# Patient Record
Sex: MALE | Race: WHITE | NOT HISPANIC OR LATINO
[De-identification: names, ages, dates, MRNs, and addresses within clinical notes are randomized per-mention and may not be internally consistent; named-entity substitution may affect disease eponyms.]

---

## 2017-11-03 ENCOUNTER — APPOINTMENT (OUTPATIENT)
Dept: OTHER | Facility: CLINIC | Age: 52
End: 2017-11-03
Payer: COMMERCIAL

## 2017-11-03 VITALS
OXYGEN SATURATION: 99 % | WEIGHT: 213 LBS | RESPIRATION RATE: 16 BRPM | DIASTOLIC BLOOD PRESSURE: 81 MMHG | HEIGHT: 73 IN | HEART RATE: 70 BPM | BODY MASS INDEX: 28.23 KG/M2 | SYSTOLIC BLOOD PRESSURE: 145 MMHG

## 2017-11-03 PROCEDURE — 99213 OFFICE O/P EST LOW 20 MIN: CPT | Mod: 25

## 2017-11-03 PROCEDURE — 94010 BREATHING CAPACITY TEST: CPT

## 2017-11-03 PROCEDURE — 96150: CPT

## 2017-11-03 PROCEDURE — 99396 PREV VISIT EST AGE 40-64: CPT

## 2017-11-03 RX ORDER — AMLODIPINE AND VALSARTAN 5; 160 MG/1; MG/1
5-160 TABLET, FILM COATED ORAL
Qty: 90 | Refills: 0 | Status: ACTIVE | COMMUNITY
Start: 2016-09-01

## 2017-11-06 LAB
ALBUMIN SERPL ELPH-MCNC: 4.6 G/DL
ALP BLD-CCNC: 103 U/L
ALT SERPL-CCNC: 75 U/L
ANION GAP SERPL CALC-SCNC: 15 MMOL/L
APPEARANCE: CLEAR
AST SERPL-CCNC: 37 U/L
BASOPHILS # BLD AUTO: 0.03 K/UL
BASOPHILS NFR BLD AUTO: 0.4 %
BILIRUB SERPL-MCNC: 0.7 MG/DL
BILIRUBIN URINE: NEGATIVE
BLOOD URINE: NEGATIVE
BUN SERPL-MCNC: 17 MG/DL
CALCIUM SERPL-MCNC: 10.4 MG/DL
CHLORIDE SERPL-SCNC: 101 MMOL/L
CHOLEST SERPL-MCNC: 217 MG/DL
CHOLEST/HDLC SERPL: 5 RATIO
CO2 SERPL-SCNC: 25 MMOL/L
COLOR: YELLOW
CREAT SERPL-MCNC: 1.09 MG/DL
EOSINOPHIL # BLD AUTO: 0.1 K/UL
EOSINOPHIL NFR BLD AUTO: 1.5 %
GLUCOSE QUALITATIVE U: NEGATIVE MG/DL
GLUCOSE SERPL-MCNC: 84 MG/DL
HCT VFR BLD CALC: 44.5 %
HDLC SERPL-MCNC: 43 MG/DL
HGB BLD-MCNC: 15.3 G/DL
IMM GRANULOCYTES NFR BLD AUTO: 0.1 %
KETONES URINE: NEGATIVE
LDLC SERPL CALC-MCNC: 123 MG/DL
LEUKOCYTE ESTERASE URINE: NEGATIVE
LYMPHOCYTES # BLD AUTO: 1.93 K/UL
LYMPHOCYTES NFR BLD AUTO: 28.3 %
MAN DIFF?: NORMAL
MCHC RBC-ENTMCNC: 31 PG
MCHC RBC-ENTMCNC: 34.4 GM/DL
MCV RBC AUTO: 90.3 FL
MONOCYTES # BLD AUTO: 0.5 K/UL
MONOCYTES NFR BLD AUTO: 7.3 %
NEUTROPHILS # BLD AUTO: 4.25 K/UL
NEUTROPHILS NFR BLD AUTO: 62.4 %
NITRITE URINE: NEGATIVE
PH URINE: 6
PLATELET # BLD AUTO: 263 K/UL
POTASSIUM SERPL-SCNC: 5.2 MMOL/L
PROT SERPL-MCNC: 7.5 G/DL
PROTEIN URINE: NEGATIVE MG/DL
RBC # BLD: 4.93 M/UL
RBC # FLD: 12.3 %
SODIUM SERPL-SCNC: 141 MMOL/L
SPECIFIC GRAVITY URINE: 1.01
TRIGL SERPL-MCNC: 253 MG/DL
UROBILINOGEN URINE: NEGATIVE MG/DL
WBC # FLD AUTO: 6.82 K/UL

## 2017-11-28 ENCOUNTER — APPOINTMENT (OUTPATIENT)
Dept: PSYCHIATRY | Facility: CLINIC | Age: 52
End: 2017-11-28
Payer: COMMERCIAL

## 2017-11-28 PROCEDURE — 90791 PSYCH DIAGNOSTIC EVALUATION: CPT

## 2017-12-05 ENCOUNTER — APPOINTMENT (OUTPATIENT)
Dept: GASTROENTEROLOGY | Facility: CLINIC | Age: 52
End: 2017-12-05
Payer: COMMERCIAL

## 2017-12-05 VITALS
BODY MASS INDEX: 28.49 KG/M2 | TEMPERATURE: 98 F | RESPIRATION RATE: 16 BRPM | HEIGHT: 73 IN | SYSTOLIC BLOOD PRESSURE: 148 MMHG | WEIGHT: 215 LBS | DIASTOLIC BLOOD PRESSURE: 82 MMHG | HEART RATE: 74 BPM

## 2017-12-05 DIAGNOSIS — Z12.11 ENCOUNTER FOR SCREENING FOR MALIGNANT NEOPLASM OF COLON: ICD-10-CM

## 2017-12-05 PROCEDURE — 99203 OFFICE O/P NEW LOW 30 MIN: CPT

## 2018-01-16 ENCOUNTER — EMERGENCY (EMERGENCY)
Facility: HOSPITAL | Age: 53
LOS: 1 days | Discharge: HOME | End: 2018-01-16
Attending: EMERGENCY MEDICINE
Payer: COMMERCIAL

## 2018-01-16 VITALS
HEIGHT: 74 IN | WEIGHT: 220.02 LBS | RESPIRATION RATE: 18 BRPM | DIASTOLIC BLOOD PRESSURE: 80 MMHG | HEART RATE: 75 BPM | SYSTOLIC BLOOD PRESSURE: 156 MMHG | TEMPERATURE: 98 F | OXYGEN SATURATION: 97 %

## 2018-01-16 DIAGNOSIS — Z98.89 OTHER SPECIFIED POSTPROCEDURAL STATES: Chronic | ICD-10-CM

## 2018-01-16 PROCEDURE — 73562 X-RAY EXAM OF KNEE 3: CPT

## 2018-01-16 PROCEDURE — 29505 APPLICATION LONG LEG SPLINT: CPT | Mod: LT

## 2018-01-16 PROCEDURE — 99284 EMERGENCY DEPT VISIT MOD MDM: CPT | Mod: 25

## 2018-01-16 PROCEDURE — 20610 DRAIN/INJ JOINT/BURSA W/O US: CPT | Mod: LT

## 2018-01-16 PROCEDURE — 73562 X-RAY EXAM OF KNEE 3: CPT | Mod: 26,LT

## 2018-01-16 PROCEDURE — 99284 EMERGENCY DEPT VISIT MOD MDM: CPT

## 2018-01-16 RX ORDER — IBUPROFEN 200 MG
600 TABLET ORAL ONCE
Qty: 0 | Refills: 0 | Status: COMPLETED | OUTPATIENT
Start: 2018-01-16 | End: 2018-01-16

## 2018-01-16 RX ADMIN — Medication 600 MILLIGRAM(S): at 14:26

## 2018-01-16 RX ADMIN — Medication 600 MILLIGRAM(S): at 12:57

## 2018-01-16 NOTE — ED ADULT NURSE NOTE - OBJECTIVE STATEMENT
AOX3 +ambulatory patient reports he twisted his knee while getting out of the car x today. Patient c/o L knee pain. +sensory ROM lImited

## 2018-01-16 NOTE — CONSULT NOTE ADULT - SUBJECTIVE AND OBJECTIVE BOX
HPI: Patient is a 54 y/o male, s/p left knee arthroscopy about 20 years ago who presents to ER with complaint of severe knee pain and swelling. Patient states that he twisted his left knee in his car at work today.     PAST MEDICAL & SURGICAL HISTORY:  Hypertension  History of left arthroscopy of left knee    Review of systems: Non Contributory    MEDICATIONS  (STANDING):    Allergies: No known Allergies    Vital Signs Last 24 Hrs  T(C): 36.7 (16 Jan 2018 12:45), Max: 36.7 (16 Jan 2018 12:45)  T(F): 98.1 (16 Jan 2018 12:45), Max: 98.1 (16 Jan 2018 12:45)  HR: 75 (16 Jan 2018 12:45) (75 - 75)  BP: 156/80 (16 Jan 2018 12:45) (156/80 - 156/80)  BP(mean): --  RR: 18 (16 Jan 2018 12:45) (18 - 18)  SpO2: 97% (16 Jan 2018 12:45) (97% - 97%)    Physical Examination:    Musculoskeletal:   Left knee: Positive tenderness to palpation of joint line, 3+ effusion, pain on flexion of the knee, decreased range of motion, positive [facility] maneuver.     Neurovascularly Intact    RADIOLOGY & ADDITIONAL STUDIES: X ray: DJD and multiple loose bodies.    ASSESSMENT: Left knee sprain, preexisting DJD, severe swelling and pain.     PLAN/RECOMMENDATION: Under sterile condition, left knee was aspirated and about 40 CC of yellowish fluid was obtained, and was injected with 80 mg of Depomedrol and 5 CC of lidocaine was injected. Patient tolerated procedure well. A long leg splint immobilizer was applied. Cane was given.     FOLLOW UP: With office in 3-4 days.

## 2018-01-16 NOTE — ED PROVIDER NOTE - PHYSICAL EXAMINATION
L knee: limited ROM, no obvious deformity, no swelling/effusion, ecchymosis, neg drawer L knee: limited ROM, no obvious deformity, mild swelling, ecchymosis, neg drawer

## 2018-01-16 NOTE — ED PROVIDER NOTE - MEDICAL DECISION MAKING DETAILS
pt well appearing, antalgic gait, limited ROM secondary to pain to R knee, Abdulkadir, VIET to place in knee immobilizer, crutches, and dc home as pt going through workman's comp. Instructions to RICE. OTC IBU.

## 2018-01-16 NOTE — ED PROVIDER NOTE - OBJECTIVE STATEMENT
52 y/o male, pmhx HTN, psx "Lknee surgery", c/o L knee pain s/p "twisting knee" in car at work today. Denies falls, laxity, fever/chills, h/o gout or any other concerns. 54 y/o male, pmhx HTN, psx "L knee surgery", c/o L knee pain s/p "twisting knee" in car at work today. Denies falls, laxity, fever/chills, h/o gout or any other concerns. Confirms limited ROM.

## 2018-01-16 NOTE — ED PROVIDER NOTE - ATTENDING CONTRIBUTION TO CARE
52 y/o male c/o L knee pain s/p "twisting knee" in car at work today. PE reveals mild swelling left anterior knee. (-) ant/post drawer test. (-) lachmans. XR (-) for fx. Will d/c home to f/u with ortho as outpt.

## 2018-02-03 ENCOUNTER — MOBILE ON CALL (OUTPATIENT)
Age: 53
End: 2018-02-03

## 2019-03-29 ENCOUNTER — APPOINTMENT (OUTPATIENT)
Dept: OTHER | Facility: CLINIC | Age: 54
End: 2019-03-29
Payer: COMMERCIAL

## 2019-03-29 VITALS
DIASTOLIC BLOOD PRESSURE: 83 MMHG | HEIGHT: 73 IN | SYSTOLIC BLOOD PRESSURE: 126 MMHG | WEIGHT: 216 LBS | BODY MASS INDEX: 28.63 KG/M2 | HEART RATE: 75 BPM | RESPIRATION RATE: 18 BRPM | OXYGEN SATURATION: 98 % | TEMPERATURE: 97.8 F

## 2019-03-29 DIAGNOSIS — Z78.9 OTHER SPECIFIED HEALTH STATUS: ICD-10-CM

## 2019-03-29 PROCEDURE — 99396 PREV VISIT EST AGE 40-64: CPT | Mod: 25

## 2019-03-29 PROCEDURE — 99214 OFFICE O/P EST MOD 30 MIN: CPT | Mod: 25

## 2019-03-29 PROCEDURE — 94010 BREATHING CAPACITY TEST: CPT

## 2019-03-29 PROCEDURE — 96150: CPT

## 2019-04-01 LAB
ALBUMIN SERPL ELPH-MCNC: 4.5 G/DL
ALP BLD-CCNC: 141 U/L
ALT SERPL-CCNC: 34 U/L
ANION GAP SERPL CALC-SCNC: 12 MMOL/L
APPEARANCE: ABNORMAL
AST SERPL-CCNC: 25 U/L
BACTERIA: NEGATIVE
BASOPHILS # BLD AUTO: 0.04 K/UL
BASOPHILS NFR BLD AUTO: 0.6 %
BILIRUB SERPL-MCNC: 0.2 MG/DL
BILIRUBIN URINE: NEGATIVE
BLOOD URINE: NEGATIVE
BUN SERPL-MCNC: 16 MG/DL
CALCIUM SERPL-MCNC: 10 MG/DL
CHLORIDE SERPL-SCNC: 100 MMOL/L
CHOLEST SERPL-MCNC: 183 MG/DL
CHOLEST/HDLC SERPL: 5.7 RATIO
CO2 SERPL-SCNC: 27 MMOL/L
COLOR: YELLOW
CREAT SERPL-MCNC: 1.16 MG/DL
EOSINOPHIL # BLD AUTO: 0.16 K/UL
EOSINOPHIL NFR BLD AUTO: 2.2 %
GLUCOSE QUALITATIVE U: NEGATIVE
GLUCOSE SERPL-MCNC: 107 MG/DL
HCT VFR BLD CALC: 43.7 %
HDLC SERPL-MCNC: 32 MG/DL
HGB BLD-MCNC: 13.8 G/DL
HYALINE CASTS: 0 /LPF
IMM GRANULOCYTES NFR BLD AUTO: 0.1 %
KETONES URINE: NEGATIVE
LDLC SERPL CALC-MCNC: 77 MG/DL
LEUKOCYTE ESTERASE URINE: NEGATIVE
LYMPHOCYTES # BLD AUTO: 1.91 K/UL
LYMPHOCYTES NFR BLD AUTO: 26.3 %
MAN DIFF?: NORMAL
MCHC RBC-ENTMCNC: 29.8 PG
MCHC RBC-ENTMCNC: 31.6 GM/DL
MCV RBC AUTO: 94.4 FL
MICROSCOPIC-UA: NORMAL
MONOCYTES # BLD AUTO: 0.86 K/UL
MONOCYTES NFR BLD AUTO: 11.8 %
NEUTROPHILS # BLD AUTO: 4.28 K/UL
NEUTROPHILS NFR BLD AUTO: 59 %
NITRITE URINE: NEGATIVE
PH URINE: 6
PLATELET # BLD AUTO: 291 K/UL
POTASSIUM SERPL-SCNC: 4.6 MMOL/L
PROT SERPL-MCNC: 7.5 G/DL
PROTEIN URINE: ABNORMAL
RBC # BLD: 4.63 M/UL
RBC # FLD: 11.9 %
RED BLOOD CELLS URINE: 4 /HPF
SODIUM SERPL-SCNC: 139 MMOL/L
SPECIFIC GRAVITY URINE: 1.03
SQUAMOUS EPITHELIAL CELLS: 0 /HPF
TRIGL SERPL-MCNC: 368 MG/DL
UROBILINOGEN URINE: NORMAL
WBC # FLD AUTO: 7.26 K/UL
WHITE BLOOD CELLS URINE: 1 /HPF

## 2019-05-28 ENCOUNTER — MEDICATION RENEWAL (OUTPATIENT)
Age: 54
End: 2019-05-28

## 2019-06-04 ENCOUNTER — APPOINTMENT (OUTPATIENT)
Dept: GASTROENTEROLOGY | Facility: CLINIC | Age: 54
End: 2019-06-04

## 2019-12-27 ENCOUNTER — MEDICATION RENEWAL (OUTPATIENT)
Age: 54
End: 2019-12-27

## 2020-04-27 ENCOUNTER — RX RENEWAL (OUTPATIENT)
Age: 55
End: 2020-04-27

## 2020-05-15 ENCOUNTER — APPOINTMENT (OUTPATIENT)
Dept: OTHER | Facility: CLINIC | Age: 55
End: 2020-05-15
Payer: COMMERCIAL

## 2020-05-15 PROCEDURE — 99442: CPT | Mod: 95

## 2020-05-15 PROCEDURE — 99396 PREV VISIT EST AGE 40-64: CPT | Mod: 95

## 2020-05-17 ENCOUNTER — FORM ENCOUNTER (OUTPATIENT)
Age: 55
End: 2020-05-17

## 2020-06-15 ENCOUNTER — APPOINTMENT (OUTPATIENT)
Dept: GASTROENTEROLOGY | Facility: CLINIC | Age: 55
End: 2020-06-15

## 2022-02-07 ENCOUNTER — RX RENEWAL (OUTPATIENT)
Age: 57
End: 2022-02-07

## 2022-07-26 ENCOUNTER — EMERGENCY (EMERGENCY)
Facility: HOSPITAL | Age: 57
LOS: 1 days | Discharge: HOME | End: 2022-07-26
Attending: EMERGENCY MEDICINE
Payer: COMMERCIAL

## 2022-07-26 VITALS
DIASTOLIC BLOOD PRESSURE: 85 MMHG | WEIGHT: 214.95 LBS | HEART RATE: 69 BPM | SYSTOLIC BLOOD PRESSURE: 173 MMHG | RESPIRATION RATE: 18 BRPM | OXYGEN SATURATION: 98 % | TEMPERATURE: 97 F | HEIGHT: 74 IN

## 2022-07-26 DIAGNOSIS — Z98.89 OTHER SPECIFIED POSTPROCEDURAL STATES: Chronic | ICD-10-CM

## 2022-07-26 PROCEDURE — 73080 X-RAY EXAM OF ELBOW: CPT

## 2022-07-26 PROCEDURE — 99283 EMERGENCY DEPT VISIT LOW MDM: CPT

## 2022-07-26 PROCEDURE — 99283 EMERGENCY DEPT VISIT LOW MDM: CPT | Mod: 25

## 2022-07-26 PROCEDURE — 73080 X-RAY EXAM OF ELBOW: CPT | Mod: 26,LT

## 2022-07-26 NOTE — ED PROVIDER NOTE - PHYSICAL EXAMINATION
Renal insufficiency L elbow nearly full range of motion. Olecranon swelling with mild erythema. No induration or localized tenderness to palpation. L shoulder and L wrist full range of motion without swelling or tenderness to palpation.  No snuffbox tenderness.

## 2022-07-26 NOTE — ED ADULT NURSE NOTE - NSIMPLEMENTINTERV_GEN_ALL_ED
Implemented All Fall Risk Interventions:  [place] to call system. Call bell, personal items and telephone within reach. Instruct patient to call for assistance. Room bathroom lighting operational. Non-slip footwear when patient is off stretcher. Physically safe environment: no spills, clutter or unnecessary equipment. Stretcher in lowest position, wheels locked, appropriate side rails in place. Provide visual cue, wrist band, yellow gown, etc. Monitor gait and stability. Monitor for mental status changes and reorient to person, place, and time. Review medications for side effects contributing to fall risk. Reinforce activity limits and safety measures with patient and family.

## 2022-07-26 NOTE — ED PROVIDER NOTE - CLINICAL SUMMARY MEDICAL DECISION MAKING FREE TEXT BOX
57 year-old male, presents for L elbow swelling s/p mechanical fall earlier today. neurovascularly intact. Plan: XR, re-assess.

## 2022-07-26 NOTE — ED ADULT NURSE NOTE - OBJECTIVE STATEMENT
Pt S/P mechanical fall, tripped on stairs and fell on elbow    'tripped up stairs" fell on left elbow c/o pain

## 2022-07-26 NOTE — ED PROVIDER NOTE - PROGRESS NOTE DETAILS
XR negative for fracture. Incidental FB on XR on soft tissue. No related to injury today. Patient informed. Dc olecranon bursitis. Unlikely infection. Most likely traumatic. NSAIDs, ACE wrap and PMD follow up in 5-7 days. Pt is well appearing walking with steady gait, stable for discharge and follow up without fail with medical doctor. I had a detailed discussion with the patient and/or guardian regarding the historical points, exam findings, and any diagnostic results supporting the discharge diagnosis. Pt educated on care and need for follow up. Strict return instructions and red flag signs and symptoms discussed with patient. Questions answered. Pt shows understanding of discharge information and agrees to follow.

## 2022-07-26 NOTE — ED PROVIDER NOTE - OBJECTIVE STATEMENT
57 year-old male, presents with L elbow swelling/pain s/p fall today. Was going up the stairs and tripped and landed on the L elbow. Since then developed swelling to the elbow. Associated with mild pain. Denies any shooting pain down the arm, tingling, numbness, focal weakness or any other complaints.

## 2022-07-26 NOTE — ED PROVIDER NOTE - NSFOLLOWUPINSTRUCTIONS_ED_ALL_ED_FT
Follow up with the primary care doctor in 5-7 days.  If symptoms are not improving in 7-10 days you should follow up with the orthopedist.  For pain you can take over the counter Ibuprofen 600 mg orally every 6 hours as needed for pain. Take medication with food.     If you experience any new or worsening symptoms or if you are concerned you can always come back to the emergency for a re-evaluation.

## 2022-07-26 NOTE — ED PROVIDER NOTE - NS ED ATTENDING STATEMENT MOD
This was a shared visit with the AUBREY. I reviewed and verified the documentation and independently performed the documented:

## 2022-08-22 ENCOUNTER — RESULT REVIEW (OUTPATIENT)
Age: 57
End: 2022-08-22

## 2022-09-25 ENCOUNTER — NON-APPOINTMENT (OUTPATIENT)
Age: 57
End: 2022-09-25

## 2022-10-27 ENCOUNTER — APPOINTMENT (OUTPATIENT)
Dept: OTHER | Facility: CLINIC | Age: 57
End: 2022-10-27

## 2022-10-28 ENCOUNTER — APPOINTMENT (OUTPATIENT)
Dept: OTHER | Facility: CLINIC | Age: 57
End: 2022-10-28

## 2022-10-28 VITALS
HEIGHT: 73 IN | SYSTOLIC BLOOD PRESSURE: 155 MMHG | DIASTOLIC BLOOD PRESSURE: 74 MMHG | WEIGHT: 218 LBS | OXYGEN SATURATION: 98 % | HEART RATE: 70 BPM | TEMPERATURE: 98.7 F | BODY MASS INDEX: 28.89 KG/M2

## 2022-10-28 PROCEDURE — 94010 BREATHING CAPACITY TEST: CPT

## 2022-10-31 LAB
ALBUMIN SERPL ELPH-MCNC: 4.5 G/DL
ALP BLD-CCNC: 133 U/L
ALT SERPL-CCNC: 55 U/L
ANION GAP SERPL CALC-SCNC: 11 MMOL/L
APPEARANCE: CLEAR
AST SERPL-CCNC: 34 U/L
BACTERIA: NEGATIVE
BASOPHILS # BLD AUTO: 0.05 K/UL
BASOPHILS NFR BLD AUTO: 0.5 %
BILIRUB SERPL-MCNC: 0.3 MG/DL
BILIRUBIN URINE: NEGATIVE
BLOOD URINE: NEGATIVE
BUN SERPL-MCNC: 19 MG/DL
CALCIUM SERPL-MCNC: 10 MG/DL
CHLORIDE SERPL-SCNC: 105 MMOL/L
CHOLEST SERPL-MCNC: 193 MG/DL
CO2 SERPL-SCNC: 25 MMOL/L
COLOR: YELLOW
CREAT SERPL-MCNC: 1.15 MG/DL
EGFR: 74 ML/MIN/1.73M2
EOSINOPHIL # BLD AUTO: 0.25 K/UL
EOSINOPHIL NFR BLD AUTO: 2.7 %
GLUCOSE QUALITATIVE U: NEGATIVE
GLUCOSE SERPL-MCNC: 84 MG/DL
HCT VFR BLD CALC: 41.2 %
HDLC SERPL-MCNC: 44 MG/DL
HGB BLD-MCNC: 13.4 G/DL
HYALINE CASTS: 1 /LPF
IMM GRANULOCYTES NFR BLD AUTO: 0.5 %
KETONES URINE: NEGATIVE
LDLC SERPL CALC-MCNC: 108 MG/DL
LEUKOCYTE ESTERASE URINE: NEGATIVE
LYMPHOCYTES # BLD AUTO: 2.46 K/UL
LYMPHOCYTES NFR BLD AUTO: 26.6 %
MAN DIFF?: NORMAL
MCHC RBC-ENTMCNC: 29.8 PG
MCHC RBC-ENTMCNC: 32.5 GM/DL
MCV RBC AUTO: 91.8 FL
MICROSCOPIC-UA: NORMAL
MONOCYTES # BLD AUTO: 0.7 K/UL
MONOCYTES NFR BLD AUTO: 7.6 %
NEUTROPHILS # BLD AUTO: 5.73 K/UL
NEUTROPHILS NFR BLD AUTO: 62.1 %
NITRITE URINE: NEGATIVE
NONHDLC SERPL-MCNC: 148 MG/DL
PH URINE: 6
PLATELET # BLD AUTO: 308 K/UL
POTASSIUM SERPL-SCNC: 4.9 MMOL/L
PROT SERPL-MCNC: 7.2 G/DL
PROTEIN URINE: NORMAL
RBC # BLD: 4.49 M/UL
RBC # FLD: 12.4 %
RED BLOOD CELLS URINE: 2 /HPF
SODIUM SERPL-SCNC: 141 MMOL/L
SPECIFIC GRAVITY URINE: 1.03
SQUAMOUS EPITHELIAL CELLS: 0 /HPF
TRIGL SERPL-MCNC: 199 MG/DL
UROBILINOGEN URINE: NORMAL
WBC # FLD AUTO: 9.24 K/UL
WHITE BLOOD CELLS URINE: 5 /HPF

## 2022-11-15 ENCOUNTER — APPOINTMENT (OUTPATIENT)
Dept: OTHER | Facility: CLINIC | Age: 57
End: 2022-11-15

## 2022-11-15 DIAGNOSIS — Z87.891 PERSONAL HISTORY OF NICOTINE DEPENDENCE: ICD-10-CM

## 2022-11-15 PROCEDURE — 99214 OFFICE O/P EST MOD 30 MIN: CPT | Mod: 95,25

## 2022-11-15 PROCEDURE — 99396 PREV VISIT EST AGE 40-64: CPT | Mod: 95

## 2022-11-15 RX ORDER — TADALAFIL 5 MG/1
5 TABLET ORAL
Qty: 30 | Refills: 0 | Status: ACTIVE | COMMUNITY
Start: 2022-10-20

## 2022-11-15 RX ORDER — LISDEXAMFETAMINE DIMESYLATE 20 MG/1
20 CAPSULE ORAL
Qty: 21 | Refills: 0 | Status: COMPLETED | COMMUNITY
Start: 2022-08-09

## 2022-11-15 RX ORDER — SERTRALINE 25 MG/1
25 TABLET, FILM COATED ORAL
Refills: 0 | Status: COMPLETED | COMMUNITY
End: 2022-11-15

## 2022-11-16 ENCOUNTER — NON-APPOINTMENT (OUTPATIENT)
Age: 57
End: 2022-11-16

## 2023-01-10 NOTE — ED ADULT TRIAGE NOTE - NS ED NURSE BANDS TYPE
Detail Level: Zone Name band; Sunscreen Recommendations: may return in future for full skin exam to check moles and freckles

## 2023-02-19 ENCOUNTER — TRANSCRIPTION ENCOUNTER (OUTPATIENT)
Age: 58
End: 2023-02-19

## 2023-02-20 ENCOUNTER — INPATIENT (INPATIENT)
Facility: HOSPITAL | Age: 58
LOS: 2 days | Discharge: HOME HEALTH | DRG: 339 | End: 2023-02-23
Attending: SURGERY | Admitting: SURGERY
Payer: COMMERCIAL

## 2023-02-20 ENCOUNTER — RESULT REVIEW (OUTPATIENT)
Age: 58
End: 2023-02-20

## 2023-02-20 VITALS
HEART RATE: 85 BPM | OXYGEN SATURATION: 96 % | RESPIRATION RATE: 18 BRPM | DIASTOLIC BLOOD PRESSURE: 77 MMHG | HEIGHT: 73 IN | SYSTOLIC BLOOD PRESSURE: 162 MMHG | TEMPERATURE: 100 F | WEIGHT: 225.09 LBS

## 2023-02-20 DIAGNOSIS — Z98.89 OTHER SPECIFIED POSTPROCEDURAL STATES: Chronic | ICD-10-CM

## 2023-02-20 DIAGNOSIS — K35.80 UNSPECIFIED ACUTE APPENDICITIS: ICD-10-CM

## 2023-02-20 LAB
ALBUMIN SERPL ELPH-MCNC: 3.7 G/DL — SIGNIFICANT CHANGE UP (ref 3.5–5)
ALP SERPL-CCNC: 114 U/L — SIGNIFICANT CHANGE UP (ref 40–120)
ALT FLD-CCNC: 70 U/L DA — HIGH (ref 10–60)
ANION GAP SERPL CALC-SCNC: 7 MMOL/L — SIGNIFICANT CHANGE UP (ref 5–17)
APPEARANCE UR: ABNORMAL
APTT BLD: 34.1 SEC — SIGNIFICANT CHANGE UP (ref 27.5–35.5)
AST SERPL-CCNC: 25 U/L — SIGNIFICANT CHANGE UP (ref 10–40)
BACTERIA # UR AUTO: ABNORMAL /HPF
BASOPHILS # BLD AUTO: 0.05 K/UL — SIGNIFICANT CHANGE UP (ref 0–0.2)
BASOPHILS NFR BLD AUTO: 0.2 % — SIGNIFICANT CHANGE UP (ref 0–2)
BILIRUB SERPL-MCNC: 1.3 MG/DL — HIGH (ref 0.2–1.2)
BILIRUB UR-MCNC: NEGATIVE — SIGNIFICANT CHANGE UP
BLD GP AB SCN SERPL QL: SIGNIFICANT CHANGE UP
BUN SERPL-MCNC: 14 MG/DL — SIGNIFICANT CHANGE UP (ref 7–18)
CALCIUM SERPL-MCNC: 9.8 MG/DL — SIGNIFICANT CHANGE UP (ref 8.4–10.5)
CHLORIDE SERPL-SCNC: 102 MMOL/L — SIGNIFICANT CHANGE UP (ref 96–108)
CO2 SERPL-SCNC: 25 MMOL/L — SIGNIFICANT CHANGE UP (ref 22–31)
COLOR SPEC: YELLOW — SIGNIFICANT CHANGE UP
CREAT SERPL-MCNC: 1.04 MG/DL — SIGNIFICANT CHANGE UP (ref 0.5–1.3)
DIFF PNL FLD: ABNORMAL
EGFR: 83 ML/MIN/1.73M2 — SIGNIFICANT CHANGE UP
EOSINOPHIL # BLD AUTO: 0.02 K/UL — SIGNIFICANT CHANGE UP (ref 0–0.5)
EOSINOPHIL NFR BLD AUTO: 0.1 % — SIGNIFICANT CHANGE UP (ref 0–6)
EPI CELLS # UR: ABNORMAL /HPF
GLUCOSE SERPL-MCNC: 125 MG/DL — HIGH (ref 70–99)
GLUCOSE UR QL: NEGATIVE — SIGNIFICANT CHANGE UP
HCT VFR BLD CALC: 42.1 % — SIGNIFICANT CHANGE UP (ref 39–50)
HGB BLD-MCNC: 14.6 G/DL — SIGNIFICANT CHANGE UP (ref 13–17)
IMM GRANULOCYTES NFR BLD AUTO: 0.4 % — SIGNIFICANT CHANGE UP (ref 0–0.9)
INR BLD: 1.22 RATIO — HIGH (ref 0.88–1.16)
KETONES UR-MCNC: NEGATIVE — SIGNIFICANT CHANGE UP
LEUKOCYTE ESTERASE UR-ACNC: ABNORMAL
LIDOCAIN IGE QN: 83 U/L — SIGNIFICANT CHANGE UP (ref 73–393)
LYMPHOCYTES # BLD AUTO: 1.15 K/UL — SIGNIFICANT CHANGE UP (ref 1–3.3)
LYMPHOCYTES # BLD AUTO: 5.3 % — LOW (ref 13–44)
MCHC RBC-ENTMCNC: 30.4 PG — SIGNIFICANT CHANGE UP (ref 27–34)
MCHC RBC-ENTMCNC: 34.7 GM/DL — SIGNIFICANT CHANGE UP (ref 32–36)
MCV RBC AUTO: 87.7 FL — SIGNIFICANT CHANGE UP (ref 80–100)
MONOCYTES # BLD AUTO: 1.67 K/UL — HIGH (ref 0–0.9)
MONOCYTES NFR BLD AUTO: 7.7 % — SIGNIFICANT CHANGE UP (ref 2–14)
NEUTROPHILS # BLD AUTO: 18.59 K/UL — HIGH (ref 1.8–7.4)
NEUTROPHILS NFR BLD AUTO: 86.3 % — HIGH (ref 43–77)
NITRITE UR-MCNC: NEGATIVE — SIGNIFICANT CHANGE UP
NRBC # BLD: 0 /100 WBCS — SIGNIFICANT CHANGE UP (ref 0–0)
PH UR: 6 — SIGNIFICANT CHANGE UP (ref 5–8)
PLATELET # BLD AUTO: 225 K/UL — SIGNIFICANT CHANGE UP (ref 150–400)
POTASSIUM SERPL-MCNC: 3.6 MMOL/L — SIGNIFICANT CHANGE UP (ref 3.5–5.3)
POTASSIUM SERPL-SCNC: 3.6 MMOL/L — SIGNIFICANT CHANGE UP (ref 3.5–5.3)
PROT SERPL-MCNC: 7.9 G/DL — SIGNIFICANT CHANGE UP (ref 6–8.3)
PROT UR-MCNC: 30 MG/DL
PROTHROM AB SERPL-ACNC: 14.6 SEC — HIGH (ref 10.5–13.4)
RBC # BLD: 4.8 M/UL — SIGNIFICANT CHANGE UP (ref 4.2–5.8)
RBC # FLD: 12.1 % — SIGNIFICANT CHANGE UP (ref 10.3–14.5)
RBC CASTS # UR COMP ASSIST: ABNORMAL /HPF (ref 0–2)
SARS-COV-2 RNA SPEC QL NAA+PROBE: SIGNIFICANT CHANGE UP
SODIUM SERPL-SCNC: 134 MMOL/L — LOW (ref 135–145)
SP GR SPEC: 1.02 — SIGNIFICANT CHANGE UP (ref 1.01–1.02)
UROBILINOGEN FLD QL: 1
WBC # BLD: 21.24 K/UL — HIGH (ref 3.8–10.5)
WBC # FLD AUTO: 21.24 K/UL — HIGH (ref 3.8–10.5)
WBC UR QL: SIGNIFICANT CHANGE UP /HPF (ref 0–5)

## 2023-02-20 PROCEDURE — 74177 CT ABD & PELVIS W/CONTRAST: CPT | Mod: 26,MA

## 2023-02-20 PROCEDURE — 99285 EMERGENCY DEPT VISIT HI MDM: CPT

## 2023-02-20 PROCEDURE — 44970 LAPAROSCOPY APPENDECTOMY: CPT | Mod: AS

## 2023-02-20 PROCEDURE — 99222 1ST HOSP IP/OBS MODERATE 55: CPT | Mod: 57

## 2023-02-20 PROCEDURE — 88304 TISSUE EXAM BY PATHOLOGIST: CPT | Mod: 26

## 2023-02-20 PROCEDURE — 44970 LAPAROSCOPY APPENDECTOMY: CPT

## 2023-02-20 DEVICE — STAPLER COVIDIEN TRI-STAPLE CURVED 60MM PURPLE RELOAD: Type: IMPLANTABLE DEVICE | Status: FUNCTIONAL

## 2023-02-20 RX ORDER — ACETAMINOPHEN 500 MG
650 TABLET ORAL EVERY 6 HOURS
Refills: 0 | Status: DISCONTINUED | OUTPATIENT
Start: 2023-02-20 | End: 2023-02-23

## 2023-02-20 RX ORDER — PIPERACILLIN AND TAZOBACTAM 4; .5 G/20ML; G/20ML
3.38 INJECTION, POWDER, LYOPHILIZED, FOR SOLUTION INTRAVENOUS ONCE
Refills: 0 | Status: COMPLETED | OUTPATIENT
Start: 2023-02-20 | End: 2023-02-20

## 2023-02-20 RX ORDER — HYDROMORPHONE HYDROCHLORIDE 2 MG/ML
0.5 INJECTION INTRAMUSCULAR; INTRAVENOUS; SUBCUTANEOUS EVERY 4 HOURS
Refills: 0 | Status: DISCONTINUED | OUTPATIENT
Start: 2023-02-20 | End: 2023-02-22

## 2023-02-20 RX ORDER — HYDROMORPHONE HYDROCHLORIDE 2 MG/ML
1 INJECTION INTRAMUSCULAR; INTRAVENOUS; SUBCUTANEOUS
Refills: 0 | Status: DISCONTINUED | OUTPATIENT
Start: 2023-02-20 | End: 2023-02-21

## 2023-02-20 RX ORDER — ENOXAPARIN SODIUM 100 MG/ML
40 INJECTION SUBCUTANEOUS EVERY 24 HOURS
Refills: 0 | Status: DISCONTINUED | OUTPATIENT
Start: 2023-02-20 | End: 2023-02-23

## 2023-02-20 RX ORDER — SODIUM CHLORIDE 9 MG/ML
1000 INJECTION INTRAMUSCULAR; INTRAVENOUS; SUBCUTANEOUS ONCE
Refills: 0 | Status: COMPLETED | OUTPATIENT
Start: 2023-02-20 | End: 2023-02-20

## 2023-02-20 RX ORDER — HYDROMORPHONE HYDROCHLORIDE 2 MG/ML
0.5 INJECTION INTRAMUSCULAR; INTRAVENOUS; SUBCUTANEOUS
Refills: 0 | Status: DISCONTINUED | OUTPATIENT
Start: 2023-02-20 | End: 2023-02-21

## 2023-02-20 RX ORDER — SODIUM CHLORIDE 9 MG/ML
1000 INJECTION, SOLUTION INTRAVENOUS
Refills: 0 | Status: DISCONTINUED | OUTPATIENT
Start: 2023-02-20 | End: 2023-02-22

## 2023-02-20 RX ORDER — KETOROLAC TROMETHAMINE 30 MG/ML
30 SYRINGE (ML) INJECTION ONCE
Refills: 0 | Status: DISCONTINUED | OUTPATIENT
Start: 2023-02-20 | End: 2023-02-20

## 2023-02-20 RX ORDER — DEXTROSE MONOHYDRATE, SODIUM CHLORIDE, AND POTASSIUM CHLORIDE 50; .745; 4.5 G/1000ML; G/1000ML; G/1000ML
1000 INJECTION, SOLUTION INTRAVENOUS
Refills: 0 | Status: DISCONTINUED | OUTPATIENT
Start: 2023-02-20 | End: 2023-02-21

## 2023-02-20 RX ORDER — AMLODIPINE BESYLATE 2.5 MG/1
5 TABLET ORAL EVERY 24 HOURS
Refills: 0 | Status: DISCONTINUED | OUTPATIENT
Start: 2023-02-20 | End: 2023-02-23

## 2023-02-20 RX ORDER — CHLORHEXIDINE GLUCONATE 213 G/1000ML
1 SOLUTION TOPICAL DAILY
Refills: 0 | Status: COMPLETED | OUTPATIENT
Start: 2023-02-20 | End: 2023-02-22

## 2023-02-20 RX ORDER — ONDANSETRON 8 MG/1
4 TABLET, FILM COATED ORAL EVERY 6 HOURS
Refills: 0 | Status: DISCONTINUED | OUTPATIENT
Start: 2023-02-20 | End: 2023-02-23

## 2023-02-20 RX ORDER — PIPERACILLIN AND TAZOBACTAM 4; .5 G/20ML; G/20ML
3.38 INJECTION, POWDER, LYOPHILIZED, FOR SOLUTION INTRAVENOUS EVERY 8 HOURS
Refills: 0 | Status: DISCONTINUED | OUTPATIENT
Start: 2023-02-20 | End: 2023-02-23

## 2023-02-20 RX ADMIN — Medication 30 MILLIGRAM(S): at 16:16

## 2023-02-20 RX ADMIN — HYDROMORPHONE HYDROCHLORIDE 0.5 MILLIGRAM(S): 2 INJECTION INTRAMUSCULAR; INTRAVENOUS; SUBCUTANEOUS at 23:55

## 2023-02-20 RX ADMIN — Medication 30 MILLIGRAM(S): at 23:35

## 2023-02-20 RX ADMIN — SODIUM CHLORIDE 1000 MILLILITER(S): 9 INJECTION INTRAMUSCULAR; INTRAVENOUS; SUBCUTANEOUS at 15:46

## 2023-02-20 RX ADMIN — Medication 30 MILLIGRAM(S): at 15:46

## 2023-02-20 RX ADMIN — SODIUM CHLORIDE 145 MILLILITER(S): 9 INJECTION, SOLUTION INTRAVENOUS at 20:20

## 2023-02-20 RX ADMIN — PIPERACILLIN AND TAZOBACTAM 3.38 GRAM(S): 4; .5 INJECTION, POWDER, LYOPHILIZED, FOR SOLUTION INTRAVENOUS at 19:00

## 2023-02-20 RX ADMIN — SODIUM CHLORIDE 1000 MILLILITER(S): 9 INJECTION INTRAMUSCULAR; INTRAVENOUS; SUBCUTANEOUS at 16:16

## 2023-02-20 RX ADMIN — Medication 30 MILLIGRAM(S): at 23:50

## 2023-02-20 RX ADMIN — PIPERACILLIN AND TAZOBACTAM 200 GRAM(S): 4; .5 INJECTION, POWDER, LYOPHILIZED, FOR SOLUTION INTRAVENOUS at 18:20

## 2023-02-20 NOTE — ED PROVIDER NOTE - OBJECTIVE STATEMENT
58-year-old male with a past medical history of hypertension presents with lower abdominal pain and bilateral back pain for 3 days.  Patient reports no bowel movement for 3 days.  + headache and dysuria.  Denies vomiting, diarrhea, fevers. Took a dulcolax this morning with no relief.

## 2023-02-20 NOTE — BRIEF OPERATIVE NOTE - NSICDXBRIEFPREOP_GEN_ALL_CORE_FT
PRE-OP DIAGNOSIS:  Acute appendicitis with perforation and localized peritonitis 21-Feb-2023 01:02:27  Che Natarajan

## 2023-02-20 NOTE — H&P ADULT - HISTORY OF PRESENT ILLNESS
59 y/o male history of HTN presenting to the ED with two days of RLQ abdominal pain. Pt reports that the pain has gradually increased in severity over the past two days with associated back pain and chills. Reports that he has not had a bowel movement in a couple days and thought he was constipated. Last colonoscopy 5 years ago, says he is due for another. Denies chest pain, shortness of breath, fever, diarrhea, nausea, vomiting, changes in urination

## 2023-02-20 NOTE — H&P ADULT - NSHPPHYSICALEXAM_GEN_ALL_CORE
General:  Appears stated age, well-groomed, no distress  Eyes: EOMI  HENT:  WNL, no JVD  Respirations: Unlabored  Abdomen: soft, non distended, voluntary guarding, ?RLQ rebound, no diffuse peritonitis   Extremities: no edema bilaterally  Skin: warm and dry  Musculoskeletal: no calf tenderness b/l   Neuro:  Alert, oriented to time, place and person   Psych: normal affect

## 2023-02-20 NOTE — H&P ADULT - ASSESSMENT
57 y/o male with acute appendicitis with rupture, no abscess. WBC 21  -NPO, IVF  -IV abx  -Admit to surgical team under Dr. Natarajan  -Preop labs, covid swab  -Plan for laparoscopic appendectomy possible open tonight   -Pain control PRN   -EKG  -BP control   -Discussed with Dr. Natarajan

## 2023-02-20 NOTE — ED ADULT TRIAGE NOTE - CHIEF COMPLAINT QUOTE
rlq abdominal pain radi ting to the back for the last 2 days , reports no bm within the last 3 days denies vomiting

## 2023-02-20 NOTE — BRIEF OPERATIVE NOTE - NSICDXBRIEFPOSTOP_GEN_ALL_CORE_FT
POST-OP DIAGNOSIS:  Acute appendicitis with perforation and localized peritonitis 21-Feb-2023 01:03:06  Che Natarajan

## 2023-02-20 NOTE — H&P ADULT - NS ATTEND AMEND GEN_ALL_CORE FT
Pt seen and examined in ED. 2 d hx of abdominal pain. Presented to ED for evaluation. WBC 21, CT c/w acute perforated appendicitis. + RLQ tenderness + rebound. Laparoscopic possible open appendectomy indicated. PARQ discussion held, discussed risks of bleeding infection staple line dehiscence, abscess.  Pt expressed understanding and agrees to proceed.

## 2023-02-20 NOTE — BRIEF OPERATIVE NOTE - OPERATION/FINDINGS
Perforated gangrenous appendix with surrounding phlegmon, adherent to pelvic sidewall at site of abscess cavity. Base of appendix at cecum not indurated. Appendiceal tip adherent deep in pelvis. Encountered gangrenous friable appendix, in course mobiliziation tip of appendix which was very adherent deep in pelvis, fractured. PT with significant visceral fat and bowel in RLQ pelvis. Multiple attempts to retrieve small remaining tip not feasible due to anatomy. Drain left in place.

## 2023-02-20 NOTE — H&P ADULT - NSHPLABSRESULTS_GEN_ALL_CORE
----- Message from Gilles Mead sent at 5/14/2018  6:58 PM EDT -----  Regarding: Brittany LUX/Telephone  Hello,    New Pt is requesting an earlier appointment to establish care, have a physical completed, and the Pt is requesting for forms to be filled out for payee. Best contact number is [phone].     Thanks,  Jose Hebert 14.6   21.24 )-----------( 225      ( 20 Feb 2023 15:41 )             42.1   02-20    134<L>  |  102  |  14  ----------------------------<  125<H>  3.6   |  25  |  1.04    Ca    9.8      20 Feb 2023 15:41    TPro  7.9  /  Alb  3.7  /  TBili  1.3<H>  /  DBili  x   /  AST  25  /  ALT  70<H>  /  AlkPhos  114  02-20      < from: CT Abdomen and Pelvis w/ IV Cont (02.20.23 @ 17:05) >      ACC: [id number] EXAM:  CT ABDOMEN AND PELVIS IC   ORDERED BY: NAE YANEZ     PROCEDURE DATE:  02/20/2023          INTERPRETATION:  CLINICAL INFORMATION: Lower abdominal tenderness,   distention.    COMPARISON: None.    CONTRAST/COMPLICATIONS:  IV Contrast: Omnipaque 350  90 cc administered   10 cc discarded  Oral Contrast: NONE  Complications: None reported at time of study completion    PROCEDURE:  CT of the Abdomen and Pelvis was performed.  Sagittal and coronal reformats were performed.    FINDINGS:  LOWER CHEST: Small right-sided Bochdalek hernia containing fat.    LIVER: Within normal limits.  BILE DUCTS: Normal caliber.  GALLBLADDER: Within normal limits.  SPLEEN: Within normal limits.  PANCREAS: Within normal limits.  ADRENALS: Within normal limits.  KIDNEYS/URETERS: Within normal limits.    BLADDER: Within normal limits.  REPRODUCTIVE ORGANS: Prostate within normal limits.    BOWEL: No bowel obstruction. Appendix: Obstructive 8 mm appendicolith at   the appendiceal base. Fluid-filled distention of the mid appendix   measuring up to 18 mm. The appendiceal tip is decompressed.   Periappendiceal soft tissue infiltration and mild free fluid. No   extraluminal free air. No loculated fluid collection.  PERITONEUM: Trace ascites.  VESSELS: Within normal limits.  RETROPERITONEUM/LYMPH NODES: No lymphadenopathy.  ABDOMINAL WALL: Within normal limits.  BONES: Disc degeneration L4-5.    IMPRESSION:  Acute appendicitis with rupture. No periappendiceal abscess.        --- End of Report ---            ASHLEY GROVES MD; Attending Radiologist  This document has been electronically signed. Feb 20 2023  5:14PM

## 2023-02-20 NOTE — ED PROVIDER NOTE - ABDOMINAL TENDER
Mom contacted to discuss.  States PA needs to be completed with insurance so they will cover the generic vs brand name.  PA initiated   left lower quadrant/right lower quadrant

## 2023-02-20 NOTE — ED ADULT NURSE NOTE - DRUG PRE-SCREENING (DAST -1)
Initial Clinical Review    St  [address] [facility] in the [facility] by Nicolás Peters for 2017  Network Utilization Review Department  Phone: [phone]; Fax [phone]  ATTENTION: The Network Utilization Review Department is now centralized for our 7 Facilities  Make a note that we have a new phone and fax numbers for our Department  Please call with any questions or concerns to [phone] and carefully follow the prompts so that you are directed to the right person  All voicemails are confidential  Fax any determinations, approvals, denials, and requests for initial or continue stay review clinical to [phone]  Due to HIGH CALL volume, it would be easier if you could please send faxed requests to expedite your requests and in part, help us provide discharge notifications faster  Age/Sex: 54 y o  male    Surgery Date:10/17/2017  Procedure: Procedure(s) (LRB):  C5-6, C6-7 ACDF WITH ALLOGRAFT (NEURO MONITORING) (N/A)  Operative Findings:  Cervical DDD and stenosis C5-6, C6-7  Anesthesia:  General     Admission Orders: Date/Time/Statement: 10/17/17 @ 1050     Orders Placed This Encounter   Procedures    Inpatient Admission     Standing Status:   Standing     Number of Occurrences:   1     Order Specific Question:   Admitting Physician     Answer:   Jason Reyes [[id number]]     Order Specific Question:   Level of Care     Answer:   Med Surg [[id number]]     Order Specific Question:   Estimated length of stay     Answer:   More than 2 Midnights     Order Specific Question:   Certification     Answer:   I certify that inpatient services are medically necessary for this patient for a duration of greater than two midnights  See H&P and MD Progress Notes for additional information about the patient's course of treatment         Vital Signs: /69   Pulse 67   Temp 97 7 °F (36 5 °C) (Oral)   Resp 20   Ht 5' 8" (1 727 m)   Wt 77 2 kg (170 lb 3 1 oz)   SpO2 98%   BMI 25 88 kg/m²       Scheduled Meds:  atorvastatin 80 mg Oral Daily   dexamethasone 10 mg Intravenous Q8H [address]   docusate sodium 100 mg Oral BID   gabapentin 100 mg Oral TID   magnesium oxide 400 mg Oral Daily   nortriptyline 10 mg Oral HS   pantoprazole 40 mg Oral Daily   topiramate 100 mg Oral Daily     Continuous Infusions:   PRN Meds:   albuterol    Menthol    morphine injection    ondansetron    oxyCODONE    oxyCODONE    rizatriptan    tiZANidine    Nursing Orders - Vs q 4 - OOB with Brace - Neuro checlks q 4 - Neurovascular checks q 4 - Incentive spirometry  q1  - Weight bearing as tolerated - Venodynes to le's-   Diet dysphagia  Mechanical soft - PT/OT eval    10/18 Progress note -   Assessment/:  POD 1 S/P ACDF C5 to  C7   Doing well     Plan:  · Weight bearing as tolerated all extremities  · Up and out of bed  · DVT pprophylaxis- mechanical  · pain control  PT/OT Statement Selected

## 2023-02-21 ENCOUNTER — TRANSCRIPTION ENCOUNTER (OUTPATIENT)
Age: 58
End: 2023-02-21

## 2023-02-21 LAB
ANION GAP SERPL CALC-SCNC: 8 MMOL/L — SIGNIFICANT CHANGE UP (ref 5–17)
BUN SERPL-MCNC: 23 MG/DL — HIGH (ref 7–18)
CALCIUM SERPL-MCNC: 9 MG/DL — SIGNIFICANT CHANGE UP (ref 8.4–10.5)
CHLORIDE SERPL-SCNC: 105 MMOL/L — SIGNIFICANT CHANGE UP (ref 96–108)
CO2 SERPL-SCNC: 24 MMOL/L — SIGNIFICANT CHANGE UP (ref 22–31)
CREAT SERPL-MCNC: 1.71 MG/DL — HIGH (ref 0.5–1.3)
CULTURE RESULTS: NO GROWTH — SIGNIFICANT CHANGE UP
EGFR: 46 ML/MIN/1.73M2 — LOW
GLUCOSE SERPL-MCNC: 148 MG/DL — HIGH (ref 70–99)
HCT VFR BLD CALC: 38.3 % — LOW (ref 39–50)
HGB BLD-MCNC: 12.9 G/DL — LOW (ref 13–17)
MCHC RBC-ENTMCNC: 30.4 PG — SIGNIFICANT CHANGE UP (ref 27–34)
MCHC RBC-ENTMCNC: 33.7 GM/DL — SIGNIFICANT CHANGE UP (ref 32–36)
MCV RBC AUTO: 90.1 FL — SIGNIFICANT CHANGE UP (ref 80–100)
NRBC # BLD: 0 /100 WBCS — SIGNIFICANT CHANGE UP (ref 0–0)
PLATELET # BLD AUTO: 197 K/UL — SIGNIFICANT CHANGE UP (ref 150–400)
POTASSIUM SERPL-MCNC: 4.3 MMOL/L — SIGNIFICANT CHANGE UP (ref 3.5–5.3)
POTASSIUM SERPL-SCNC: 4.3 MMOL/L — SIGNIFICANT CHANGE UP (ref 3.5–5.3)
RBC # BLD: 4.25 M/UL — SIGNIFICANT CHANGE UP (ref 4.2–5.8)
RBC # FLD: 12.4 % — SIGNIFICANT CHANGE UP (ref 10.3–14.5)
SODIUM SERPL-SCNC: 137 MMOL/L — SIGNIFICANT CHANGE UP (ref 135–145)
SPECIMEN SOURCE: SIGNIFICANT CHANGE UP
WBC # BLD: 25.33 K/UL — HIGH (ref 3.8–10.5)
WBC # FLD AUTO: 25.33 K/UL — HIGH (ref 3.8–10.5)

## 2023-02-21 PROCEDURE — 99222 1ST HOSP IP/OBS MODERATE 55: CPT

## 2023-02-21 RX ORDER — SODIUM CHLORIDE 9 MG/ML
1000 INJECTION, SOLUTION INTRAVENOUS ONCE
Refills: 0 | Status: COMPLETED | OUTPATIENT
Start: 2023-02-21 | End: 2023-02-21

## 2023-02-21 RX ORDER — SODIUM CHLORIDE 9 MG/ML
500 INJECTION, SOLUTION INTRAVENOUS ONCE
Refills: 0 | Status: COMPLETED | OUTPATIENT
Start: 2023-02-21 | End: 2023-02-21

## 2023-02-21 RX ORDER — TAMSULOSIN HYDROCHLORIDE 0.4 MG/1
0.4 CAPSULE ORAL AT BEDTIME
Refills: 0 | Status: DISCONTINUED | OUTPATIENT
Start: 2023-02-21 | End: 2023-02-23

## 2023-02-21 RX ADMIN — CHLORHEXIDINE GLUCONATE 1 APPLICATION(S): 213 SOLUTION TOPICAL at 11:33

## 2023-02-21 RX ADMIN — PIPERACILLIN AND TAZOBACTAM 25 GRAM(S): 4; .5 INJECTION, POWDER, LYOPHILIZED, FOR SOLUTION INTRAVENOUS at 13:29

## 2023-02-21 RX ADMIN — HYDROMORPHONE HYDROCHLORIDE 0.5 MILLIGRAM(S): 2 INJECTION INTRAMUSCULAR; INTRAVENOUS; SUBCUTANEOUS at 20:04

## 2023-02-21 RX ADMIN — HYDROMORPHONE HYDROCHLORIDE 0.5 MILLIGRAM(S): 2 INJECTION INTRAMUSCULAR; INTRAVENOUS; SUBCUTANEOUS at 00:29

## 2023-02-21 RX ADMIN — PIPERACILLIN AND TAZOBACTAM 25 GRAM(S): 4; .5 INJECTION, POWDER, LYOPHILIZED, FOR SOLUTION INTRAVENOUS at 21:36

## 2023-02-21 RX ADMIN — SODIUM CHLORIDE 1000 MILLILITER(S): 9 INJECTION, SOLUTION INTRAVENOUS at 01:13

## 2023-02-21 RX ADMIN — HYDROMORPHONE HYDROCHLORIDE 0.5 MILLIGRAM(S): 2 INJECTION INTRAMUSCULAR; INTRAVENOUS; SUBCUTANEOUS at 04:00

## 2023-02-21 RX ADMIN — ENOXAPARIN SODIUM 40 MILLIGRAM(S): 100 INJECTION SUBCUTANEOUS at 06:03

## 2023-02-21 RX ADMIN — AMLODIPINE BESYLATE 5 MILLIGRAM(S): 2.5 TABLET ORAL at 06:03

## 2023-02-21 RX ADMIN — HYDROMORPHONE HYDROCHLORIDE 0.5 MILLIGRAM(S): 2 INJECTION INTRAMUSCULAR; INTRAVENOUS; SUBCUTANEOUS at 19:40

## 2023-02-21 RX ADMIN — HYDROMORPHONE HYDROCHLORIDE 0.5 MILLIGRAM(S): 2 INJECTION INTRAMUSCULAR; INTRAVENOUS; SUBCUTANEOUS at 08:10

## 2023-02-21 RX ADMIN — HYDROMORPHONE HYDROCHLORIDE 0.5 MILLIGRAM(S): 2 INJECTION INTRAMUSCULAR; INTRAVENOUS; SUBCUTANEOUS at 23:55

## 2023-02-21 RX ADMIN — HYDROMORPHONE HYDROCHLORIDE 0.5 MILLIGRAM(S): 2 INJECTION INTRAMUSCULAR; INTRAVENOUS; SUBCUTANEOUS at 03:44

## 2023-02-21 RX ADMIN — SODIUM CHLORIDE 500 MILLILITER(S): 9 INJECTION, SOLUTION INTRAVENOUS at 20:27

## 2023-02-21 RX ADMIN — HYDROMORPHONE HYDROCHLORIDE 0.5 MILLIGRAM(S): 2 INJECTION INTRAMUSCULAR; INTRAVENOUS; SUBCUTANEOUS at 13:25

## 2023-02-21 RX ADMIN — HYDROMORPHONE HYDROCHLORIDE 0.5 MILLIGRAM(S): 2 INJECTION INTRAMUSCULAR; INTRAVENOUS; SUBCUTANEOUS at 23:38

## 2023-02-21 RX ADMIN — TAMSULOSIN HYDROCHLORIDE 0.4 MILLIGRAM(S): 0.4 CAPSULE ORAL at 21:34

## 2023-02-21 RX ADMIN — PIPERACILLIN AND TAZOBACTAM 25 GRAM(S): 4; .5 INJECTION, POWDER, LYOPHILIZED, FOR SOLUTION INTRAVENOUS at 06:03

## 2023-02-21 RX ADMIN — Medication 30 MILLILITER(S): at 22:39

## 2023-02-21 NOTE — CONSULT NOTE ADULT - SUBJECTIVE AND OBJECTIVE BOX
HPI  57 y/o male history of HTN presenting to the ED with two days of RLQ abdominal pain. Pt reports that the pain has gradually increased in severity over the past two days with associated back pain and chills. Reports that he has not had a bowel movement in a couple days and thought he was constipated. Last colonoscopy 5 years ago, says he is due for another. Denies chest pain, shortness of breath, fever, diarrhea, nausea, vomiting, changes in urination    Patient was admitted and is now s/p appendectomy with Dr. Natarajan on . This morning patient had not voided since procedure and had 600cc of urine in the bladder. Underwent straight cath at the time. Later in the morning patient was still unable to void and had a 14Fr montalvo placed with 500cc of output. Patient had meatal stenosis making it difficult for 16Fr to be placed. Has never seen a urologist before and denies any issues with urination at home including hematuria, frequency, incomplete emptying, straining, or nocturia. States that he has had DREs and PSAs done by primary with no issues in the past. Prostate looks non-enlarged on cross sectional imaging.     PAST MEDICAL & SURGICAL HISTORY:  Hypertension      History of arthroscopy of left knee          MEDICATIONS  (STANDING):  amLODIPine   Tablet 5 milliGRAM(s) Oral every 24 hours  chlorhexidine 2% Cloths 1 Application(s) Topical daily  enoxaparin Injectable 40 milliGRAM(s) SubCutaneous every 24 hours  lactated ringers. 1000 milliLiter(s) (145 mL/Hr) IV Continuous <Continuous>  piperacillin/tazobactam IVPB.. 3.375 Gram(s) IV Intermittent every 8 hours    MEDICATIONS  (PRN):  acetaminophen     Tablet .. 650 milliGRAM(s) Oral every 6 hours PRN Temp greater or equal to 38C (100.4F), Mild Pain (1 - 3)  HYDROmorphone  Injectable 0.5 milliGRAM(s) IV Push every 4 hours PRN Pain  ondansetron Injectable 4 milliGRAM(s) IV Push every 6 hours PRN Nausea and/or Vomiting      FAMILY HISTORY:  Family history of hypertension (Father)        Allergies    No Known Allergies    Intolerances        SOCIAL HISTORY:    REVIEW OF SYSTEMS: Otherwise negative as stated in HPI    Physical Exam  Vital signs  T(C): 36.7 (23 @ 05:27), Max: 38.7 (23 @ 20:09)  HR: 72 (23 @ 05:27)  BP: 114/69 (23 @ 05:27)  SpO2: 95% (23 @ 05:27)  Wt(kg): --    Output    OUT:    Bulb (mL): 45 mL    Intermittent Catheterization - Urethral (mL): 650 mL  Total OUT: 695 mL    Total NET: -695 mL    Gen: NAD  Pulm: No respiratory distress	  CV: RRR  GI: S/ND/NT  : Montalvo with clear yellow urine  MSK: moves all 4 limbs spontaneously    LABS:       @ 05:00    WBC 25.33 / Hct 38.3  / SCr 1.71      @ 15:41    WBC 21.24 / Hct 42.1  / SCr 1.04         137  |  105  |  23<H>  ----------------------------<  148<H>  4.3   |  24  |  1.71<H>    Ca    9.0      2023 05:00    TPro  7.9  /  Alb  3.7  /  TBili  1.3<H>  /  DBili  x   /  AST  25  /  ALT  70<H>  /  AlkPhos  114      PT/INR - ( 2023 17:40 )   PT: 14.6 sec;   INR: 1.22 ratio         PTT - ( 2023 17:40 )  PTT:34.1 sec  Urinalysis Basic - ( 2023 15:41 )    Color: Yellow / Appearance: Slightly Turbid / S.020 / pH: x  Gluc: x / Ketone: Negative  / Bili: Negative / Urobili: 1   Blood: x / Protein: 30 mg/dL / Nitrite: Negative   Leuk Esterase: Trace / RBC: 5-10 /HPF / WBC 3-5 /HPF   Sq Epi: x / Non Sq Epi: Occasional /HPF / Bacteria: Trace /HPF

## 2023-02-21 NOTE — DISCHARGE NOTE PROVIDER - HOSPITAL COURSE
57 y/o male history of HTN presenting to the ED on 2/20 with two days of RLQ abdominal pain. Pt reported that the pain has gradually increased in severity over the past two days with associated back pain and chills. Last colonoscopy 5 years ago, says he is due for another. Denied any chest pain, shortness of breath, fever, diarrhea, nausea, vomiting, changes in urination or any other constitutional complaints at that time.   CT findings indicated showed "No bowel obstruction. Appendix: Obstructive 8 mm appendicolith at the appendiceal base. Fluid-filled distention of the mid appendix   measuring up to 18 mm. The appendiceal tip is decompressed. Periappendiceal soft tissue infiltration and mild free fluid. No extraluminal free air. No loculated fluid collection. "  Patient underwent laparoscopic appendectomy on 2/21. JAZLYN placed intraop.   The post-operative course included patient being in urinary retention, urology was consulted and montalvo catheter was placed. Diet was advanced as tolerated and according to bowel function, and pain was well controlled on medication. At the time of discharge, the patient was hemodynamically stable, was tolerating PO diet, was voiding urine and passing flatus, was ambulating, and was comfortable with adequate pain control. The patient was instructed to follow up with Dr. Natarajan within 1 weeks after discharge from the hospital.       INCOMPLETE 59 y/o male history of HTN presenting to the ED on 2/20 with two days of RLQ abdominal pain. Pt reported that the pain has gradually increased in severity over the past two days with associated back pain and chills. CT findings indicated showed "No bowel obstruction. Appendix: Obstructive 8 mm appendicolith at the appendiceal base. Fluid-filled distention of the mid appendix measuring up to 18 mm. The appendiceal tip is decompressed. Periappendiceal soft tissue infiltration and mild free fluid. No extraluminal free air. No loculated fluid collection. " Patient was admitted to the surgical service and underwent laparoscopic appendectomy on 2/21 with JAZLYN placed intraoperatively. The post-operative course included patient being in urinary retention, urology was consulted and montalvo catheter was placed. At the time of discharge, the patient was hemodynamically stable, was tolerating PO diet, and passing flatus and bowel movements, was ambulating, and was comfortable with adequate pain control. Montalvo catheter was draining well without pain or irritation. The patient was instructed to follow up with Dr. Love in 1 week for trial of void/montalvo removal. Additionally, the patient was instructed to follow up with Dr. Natarajan within 1-2 weeks after discharge from the hospital for postoperative evaluation and possible JAZLYN drain removal. The patient will be sent home with PO antibiotics. The patient/family felt comfortable with discharge. The patient is stable and ready for discharge to home. The patient had no other issues.      59 y/o male history of HTN presenting to the ED on 2/20 with two days of RLQ abdominal pain. Pt reported that the pain has gradually increased in severity over the past two days with associated back pain and chills. CT findings indicated showed "No bowel obstruction. Appendix: Obstructive 8 mm appendicolith at the appendiceal base. Fluid-filled distention of the mid appendix measuring up to 18 mm. The appendiceal tip is decompressed. Periappendiceal soft tissue infiltration and mild free fluid. No extraluminal free air. No loculated fluid collection. " Patient was admitted to the surgical service and underwent laparoscopic appendectomy on 2/21 with JAZLYN placed intraoperatively. The post-operative course included patient developing RIA with urinary retention, urology was consulted and montalvo catheter was placed. The RIA resolved with fluid resucitation and montalvo placement,.  At the time of discharge, the patient was hemodynamically stable, was tolerating PO diet, and passing flatus and bowel movements, was ambulating, and was comfortable with adequate pain control. Montalvo catheter was draining well without pain or irritation. The patient was instructed to follow up with Dr. Love in 1 week for trial of void/montalvo removal. Additionally, the patient was instructed to follow up with Dr. Natarajan within 1-2 weeks after discharge from the hospital for postoperative evaluation and possible JAZLYN drain removal. The patient will be sent home with PO antibiotics. The patient/family felt comfortable with discharge. The patient is stable and ready for discharge to home. The patient had no other issues.

## 2023-02-21 NOTE — DISCHARGE NOTE PROVIDER - NSDCCPTREATMENT_GEN_ALL_CORE_FT
PRINCIPAL PROCEDURE  Procedure: Lap appendectomy  Findings and Treatment: Please follow-up with your surgeon in 1 week. Drink plenty of fluids and rest as needed. Call for any fever over 101, nausea, vomiting, severe pain, no passing of gas or bowel movement.   DIET  You may resume your regular diet as normal.   SURGICAL SITES  Remove outer dressing and keep white steri-strips in place allowing them to fall off on their own. You may shower 48 hours post-operatively but do not bathe or soak in the water for 1-2 weeks; pat dry. If you notice any signs of surgical site infection (ie. redness, swelling, pain, pus drainage), please seek medical care immediately.  ACTIVITY  Do not lift anything heavier than 10 pounds for 2 weeks (2-3 months for hernia, no exercise for 2 weeks) and avoid strenuous activity for 4-6 weeks.   PAIN CONTROL  You may take Motrin 600mg (with food) or Tylenol 650mg as needed for mild pain. Stagger one medication 3 hours after the other for maximum pain control. Maximum daily dose of Tylenol should not exceed 4grams/day.   You may take your prescribed oxycodone for severe breakthrough pain not that is not relieved by Tylenol/Motrin. Do not drive or make important decisions while taking this medication and do not take more than 4 pills in 24 hours.Please refer to medical records/ progress notes for in depth hospital course. Discharge plan discussed and agreed with attending.       PRINCIPAL PROCEDURE  Procedure: Lap appendectomy  Findings and Treatment: Please follow-up with your surgeon in 1 week. Drink plenty of fluids and rest as needed. Call for any fever over 101, nausea, vomiting, severe pain, no passing of gas or bowel movement.   DIET  You may resume your regular diet as normal.   SURGICAL SITES  Remove outer dressing and keep white steri-strips in place allowing them to fall off on their own. You may shower 48 hours post-operatively but do not bathe or soak in the water for 1-2 weeks; pat dry. If you notice any signs of surgical site infection (ie. redness, swelling, pain, pus drainage), please seek medical care immediately.  ACTIVITY  Do not lift anything heavier than 10 pounds for 2 weeks (2-3 months for hernia, no exercise for 2 weeks) and avoid strenuous activity for 4-6 weeks.   PAIN CONTROL  You may take Motrin 600mg (with food) or Tylenol 650mg as needed for mild pain. Stagger one medication 3 hours after the other for maximum pain control. Maximum daily dose of Tylenol should not exceed 4grams/day.  MONTALVO   Keep montalvo catheter with leg bag until follow up appointment. Empty montalvo bag as needed.   JAZLYN drain  Keep drain in place until follow up appointment. Change outer dressings as needed. Empty bulb as needed, create vacuum seal with cap after emptying.

## 2023-02-21 NOTE — PROGRESS NOTE ADULT - SUBJECTIVE AND OBJECTIVE BOX
INTERVAL HPI/OVERNIGHT EVENTS:  Patient seen and examined at bedside   Pt resting comfortably. No acute complaints.   Tolerating diet. Denies N/V.   States he was straight cath once last night.    Patient denies chest pain, shortness of breath, fever, chills or any other constitutional complaints.     MEDICATIONS  (STANDING):  amLODIPine   Tablet 5 milliGRAM(s) Oral every 24 hours  chlorhexidine 2% Cloths 1 Application(s) Topical daily  enoxaparin Injectable 40 milliGRAM(s) SubCutaneous every 24 hours  lactated ringers. 1000 milliLiter(s) (145 mL/Hr) IV Continuous <Continuous>  piperacillin/tazobactam IVPB.. 3.375 Gram(s) IV Intermittent every 8 hours    MEDICATIONS  (PRN):  acetaminophen     Tablet .. 650 milliGRAM(s) Oral every 6 hours PRN Temp greater or equal to 38C (100.4F), Mild Pain (1 - 3)  HYDROmorphone  Injectable 0.5 milliGRAM(s) IV Push every 4 hours PRN Pain  ondansetron Injectable 4 milliGRAM(s) IV Push every 6 hours PRN Nausea and/or Vomiting      Vital Signs Last 24 Hrs  T(C): 36.7 (21 Feb 2023 05:27), Max: 38.7 (20 Feb 2023 20:09)  T(F): 98 (21 Feb 2023 05:27), Max: 101.7 (20 Feb 2023 20:09)  HR: 72 (21 Feb 2023 05:27) (68 - 89)  BP: 114/69 (21 Feb 2023 05:27) (95/55 - 162/77)  BP(mean): 84 (21 Feb 2023 05:27) (66 - 84)  RR: 18 (21 Feb 2023 05:27) (12 - 20)  SpO2: 95% (21 Feb 2023 05:27) (93% - 97%)    Parameters below as of 21 Feb 2023 05:27  Patient On (Oxygen Delivery Method): room air        Physical:  General: A&Ox3. NAD.  Respiratory: non labored  Skin: warm and dry   Abdomen: Soft, distended, nontender, no rebound tenderness  JAZLYN drain in place with serosanguineous output 45 ml/24 hr   : no montalvo in place  Extremities: non edematous, no calf pain bilaterally    I&O's Detail    20 Feb 2023 07:01  -  21 Feb 2023 07:00  --------------------------------------------------------  IN:  Total IN: 0 mL    OUT:    Bulb (mL): 45 mL    Intermittent Catheterization - Urethral (mL): 650 mL  Total OUT: 695 mL    Total NET: -695 mL          LABS:                        12.9   25.33 )-----------( 197      ( 21 Feb 2023 05:00 )             38.3             02-21    137  |  105  |  23<H>  ----------------------------<  148<H>  4.3   |  24  |  1.71<H>    Ca    9.0      21 Feb 2023 05:00    TPro  7.9  /  Alb  3.7  /  TBili  1.3<H>  /  DBili  x   /  AST  25  /  ALT  70<H>  /  AlkPhos  114  02-20    PT/INR - ( 20 Feb 2023 17:40 )   PT: 14.6 sec;   INR: 1.22 ratio         PTT - ( 20 Feb 2023 17:40 )  PTT:34.1 sec

## 2023-02-21 NOTE — CONSULT NOTE ADULT - ASSESSMENT
58M s/p appendectomy for acute appendecitis with rupture now with post operative urinary retention. Failed TOV x2, and 14Fr montalvo placed due to meatal stenosis.   - Keep montalvo  - Start flomax 0.4 mg qhs  - Since prostate not enlarged on CT, no indication for starting finasteride at the time  - Discussed with patient  - Follow up with Dr. Love outpatient early next week for outpatient TOV  - Send home with flomax  - Discussed with Dr. Love    [facility]  [address] [place] [phone]

## 2023-02-21 NOTE — DISCHARGE NOTE PROVIDER - NSDCMRMEDTOKEN_GEN_ALL_CORE_FT
acetaminophen-oxyCODONE 325 mg-5 mg oral tablet: 1 tab(s) orally every 6 hours, As Needed MDD:4 tablets - for moderate pain   Cipro 500 mg oral tablet: 1 tab(s) orally 2 times a day MDD:2 tabs  metroNIDAZOLE 500 mg oral tablet: 1 tab(s) orally 3 times a day MDD:3 tabs  tamsulosin 0.4 mg oral capsule: 1 cap(s) orally once a day (at bedtime)   Cipro 500 mg oral tablet: 1 tab(s) orally 2 times a day MDD:2 tabs  metroNIDAZOLE 500 mg oral tablet: 1 tab(s) orally 3 times a day MDD:3 tabs  oxyCODONE 5 mg oral tablet: 1 tab(s) orally every 6 hours, As Needed -for severe pain MDD:4   tamsulosin 0.4 mg oral capsule: 1 cap(s) orally once a day (at bedtime)

## 2023-02-21 NOTE — PATIENT PROFILE ADULT - FALL HARM RISK - HARM RISK INTERVENTIONS

## 2023-02-21 NOTE — DISCHARGE NOTE PROVIDER - CARE PROVIDER_API CALL
Che Natarajan (MD)  Surgery  [address] [facility]  [place] [address]  Phone: [phone]  Fax: [phone]  Follow Up Time: 1 week    Vanessa Love; MPH)  Urology  [address] [facility], [address] [place] [address]  [place] [address]  Phone: [phone]  Fax: [phone]  Follow Up Time: 1 week

## 2023-02-21 NOTE — DISCHARGE NOTE PROVIDER - ATTENDING DISCHARGE PHYSICAL EXAMINATION:
Pt seen and examined. Reports mild abdominal pain, mostly at suprapubic drain site.  Afeb vss, abd soft, NT, ND no guarding no rebound JAZLYN drain serosang, murky  Labs reviewed, WBC nml, BMP nml  Tolerating diet. Pain controlled with oral meds, + bowel function  Stable for discharge to home today. Discussed discharge instructions, signs and symptoms to call for, and he is aware of how to care for and empty drain.  Pt seen and examined. Reports mild abdominal pain, mostly at suprapubic drain site.  Afeb vss, abd soft, NT, ND no guarding no rebound JAZLYN drain serosang, murky  Labs reviewed, WBC nml, BMP nml  Tolerating diet. Pain controlled with oral meds, + bowel function  RIA resolved after montalvo placement and increased fluid resuscitation. Will F/u with Dr. Love of Urology for TOV.  Stable for discharge to home today. Discussed discharge instructions, signs and symptoms to call for, and he is aware of how to care for and empty drain.

## 2023-02-21 NOTE — CONSULT NOTE ADULT - ATTENDING COMMENTS
Pt seen and examined. Agree with note as written above    - Plan as above  - FU as outpt next week for trial of void

## 2023-02-21 NOTE — DISCHARGE NOTE PROVIDER - PROVIDER TOKENS
PROVIDER:[TOKEN:[[id number]],FOLLOWUP:[1 week]],PROVIDER:[TOKEN:[[id number]],FOLLOWUP:[1 week]]

## 2023-02-22 LAB
ANION GAP SERPL CALC-SCNC: 6 MMOL/L — SIGNIFICANT CHANGE UP (ref 5–17)
BUN SERPL-MCNC: 19 MG/DL — HIGH (ref 7–18)
CALCIUM SERPL-MCNC: 8.6 MG/DL — SIGNIFICANT CHANGE UP (ref 8.4–10.5)
CHLORIDE SERPL-SCNC: 104 MMOL/L — SIGNIFICANT CHANGE UP (ref 96–108)
CO2 SERPL-SCNC: 26 MMOL/L — SIGNIFICANT CHANGE UP (ref 22–31)
CREAT SERPL-MCNC: 1.33 MG/DL — HIGH (ref 0.5–1.3)
EGFR: 62 ML/MIN/1.73M2 — SIGNIFICANT CHANGE UP
GLUCOSE SERPL-MCNC: 104 MG/DL — HIGH (ref 70–99)
HCT VFR BLD CALC: 33.7 % — LOW (ref 39–50)
HGB BLD-MCNC: 11.5 G/DL — LOW (ref 13–17)
MCHC RBC-ENTMCNC: 31.3 PG — SIGNIFICANT CHANGE UP (ref 27–34)
MCHC RBC-ENTMCNC: 34.1 GM/DL — SIGNIFICANT CHANGE UP (ref 32–36)
MCV RBC AUTO: 91.8 FL — SIGNIFICANT CHANGE UP (ref 80–100)
NRBC # BLD: 0 /100 WBCS — SIGNIFICANT CHANGE UP (ref 0–0)
PLATELET # BLD AUTO: 165 K/UL — SIGNIFICANT CHANGE UP (ref 150–400)
POTASSIUM SERPL-MCNC: 3.7 MMOL/L — SIGNIFICANT CHANGE UP (ref 3.5–5.3)
POTASSIUM SERPL-SCNC: 3.7 MMOL/L — SIGNIFICANT CHANGE UP (ref 3.5–5.3)
RBC # BLD: 3.67 M/UL — LOW (ref 4.2–5.8)
RBC # FLD: 12.2 % — SIGNIFICANT CHANGE UP (ref 10.3–14.5)
SODIUM SERPL-SCNC: 136 MMOL/L — SIGNIFICANT CHANGE UP (ref 135–145)
WBC # BLD: 13.11 K/UL — HIGH (ref 3.8–10.5)
WBC # FLD AUTO: 13.11 K/UL — HIGH (ref 3.8–10.5)

## 2023-02-22 RX ORDER — KETOROLAC TROMETHAMINE 30 MG/ML
15 SYRINGE (ML) INJECTION EVERY 6 HOURS
Refills: 0 | Status: DISCONTINUED | OUTPATIENT
Start: 2023-02-22 | End: 2023-02-23

## 2023-02-22 RX ADMIN — PIPERACILLIN AND TAZOBACTAM 25 GRAM(S): 4; .5 INJECTION, POWDER, LYOPHILIZED, FOR SOLUTION INTRAVENOUS at 06:22

## 2023-02-22 RX ADMIN — TAMSULOSIN HYDROCHLORIDE 0.4 MILLIGRAM(S): 0.4 CAPSULE ORAL at 21:24

## 2023-02-22 RX ADMIN — PIPERACILLIN AND TAZOBACTAM 25 GRAM(S): 4; .5 INJECTION, POWDER, LYOPHILIZED, FOR SOLUTION INTRAVENOUS at 13:52

## 2023-02-22 RX ADMIN — HYDROMORPHONE HYDROCHLORIDE 0.5 MILLIGRAM(S): 2 INJECTION INTRAMUSCULAR; INTRAVENOUS; SUBCUTANEOUS at 04:07

## 2023-02-22 RX ADMIN — ENOXAPARIN SODIUM 40 MILLIGRAM(S): 100 INJECTION SUBCUTANEOUS at 06:22

## 2023-02-22 RX ADMIN — HYDROMORPHONE HYDROCHLORIDE 0.5 MILLIGRAM(S): 2 INJECTION INTRAMUSCULAR; INTRAVENOUS; SUBCUTANEOUS at 08:28

## 2023-02-22 RX ADMIN — PIPERACILLIN AND TAZOBACTAM 25 GRAM(S): 4; .5 INJECTION, POWDER, LYOPHILIZED, FOR SOLUTION INTRAVENOUS at 21:30

## 2023-02-22 RX ADMIN — HYDROMORPHONE HYDROCHLORIDE 0.5 MILLIGRAM(S): 2 INJECTION INTRAMUSCULAR; INTRAVENOUS; SUBCUTANEOUS at 08:13

## 2023-02-22 RX ADMIN — CHLORHEXIDINE GLUCONATE 1 APPLICATION(S): 213 SOLUTION TOPICAL at 12:31

## 2023-02-22 RX ADMIN — SODIUM CHLORIDE 145 MILLILITER(S): 9 INJECTION, SOLUTION INTRAVENOUS at 04:07

## 2023-02-22 RX ADMIN — SODIUM CHLORIDE 145 MILLILITER(S): 9 INJECTION, SOLUTION INTRAVENOUS at 08:15

## 2023-02-22 RX ADMIN — HYDROMORPHONE HYDROCHLORIDE 0.5 MILLIGRAM(S): 2 INJECTION INTRAMUSCULAR; INTRAVENOUS; SUBCUTANEOUS at 04:30

## 2023-02-22 NOTE — PROGRESS NOTE ADULT - SUBJECTIVE AND OBJECTIVE BOX
pt states rlq pain on and off. glen diet  ICU Vital Signs Last 24 Hrs  T(C): 37.1 (22 Feb 2023 05:36), Max: 37.4 (21 Feb 2023 14:01)  T(F): 98.8 (22 Feb 2023 05:36), Max: 99.4 (21 Feb 2023 14:01)  HR: 90 (22 Feb 2023 05:36) (90 - 92)  BP: 130/72 (22 Feb 2023 05:36) (129/66 - 157/64)  BP(mean): --  ABP: --  ABP(mean): --  RR: 18 (22 Feb 2023 05:36) (17 - 18)  SpO2: 100% (22 Feb 2023 05:36) (96% - 100%)    O2 Parameters below as of 22 Feb 2023 05:36  Patient On (Oxygen Delivery Method): room air        Alert nad  Abd: soft rlq pain, lois: serous                          11.5   13.11 )-----------( 165      ( 22 Feb 2023 05:20 )             33.7   02-22    136  |  104  |  19<H>  ----------------------------<  104<H>  3.7   |  26  |  1.33<H>    Ca    8.6      22 Feb 2023 05:20    TPro  7.9  /  Alb  3.7  /  TBili  1.3<H>  /  DBili  x   /  AST  25  /  ALT  70<H>  /  AlkPhos  114  02-20

## 2023-02-22 NOTE — PROGRESS NOTE ADULT - ASSESSMENT
58y.o. Male s/p laparoscopic appendectomy POD #1  afebrile, wbc 25; on zosyn   BUN/ Crt 23/1.7; on IVF     - NPO, IVF  - iv abx  - f/u am labs   - TOV  - oob, ambulate as tolerate, ISS, dvt ppx  - pain meds, antiemetic, antipyretic PRN   - discussed and agreed with Dr. Natarajan     
sp lap appy  abd pain  glen diet      iv abx  observation

## 2023-02-23 ENCOUNTER — TRANSCRIPTION ENCOUNTER (OUTPATIENT)
Age: 58
End: 2023-02-23

## 2023-02-23 VITALS
DIASTOLIC BLOOD PRESSURE: 73 MMHG | TEMPERATURE: 98 F | HEART RATE: 70 BPM | RESPIRATION RATE: 17 BRPM | SYSTOLIC BLOOD PRESSURE: 141 MMHG | OXYGEN SATURATION: 97 %

## 2023-02-23 LAB
ANION GAP SERPL CALC-SCNC: 6 MMOL/L — SIGNIFICANT CHANGE UP (ref 5–17)
BUN SERPL-MCNC: 16 MG/DL — SIGNIFICANT CHANGE UP (ref 7–18)
CALCIUM SERPL-MCNC: 8.4 MG/DL — SIGNIFICANT CHANGE UP (ref 8.4–10.5)
CHLORIDE SERPL-SCNC: 108 MMOL/L — SIGNIFICANT CHANGE UP (ref 96–108)
CO2 SERPL-SCNC: 25 MMOL/L — SIGNIFICANT CHANGE UP (ref 22–31)
CREAT SERPL-MCNC: 1.07 MG/DL — SIGNIFICANT CHANGE UP (ref 0.5–1.3)
EGFR: 80 ML/MIN/1.73M2 — SIGNIFICANT CHANGE UP
GLUCOSE SERPL-MCNC: 104 MG/DL — HIGH (ref 70–99)
HCT VFR BLD CALC: 34.3 % — LOW (ref 39–50)
HGB BLD-MCNC: 11.4 G/DL — LOW (ref 13–17)
MCHC RBC-ENTMCNC: 29.9 PG — SIGNIFICANT CHANGE UP (ref 27–34)
MCHC RBC-ENTMCNC: 33.2 GM/DL — SIGNIFICANT CHANGE UP (ref 32–36)
MCV RBC AUTO: 90 FL — SIGNIFICANT CHANGE UP (ref 80–100)
NRBC # BLD: 0 /100 WBCS — SIGNIFICANT CHANGE UP (ref 0–0)
PLATELET # BLD AUTO: 178 K/UL — SIGNIFICANT CHANGE UP (ref 150–400)
POTASSIUM SERPL-MCNC: 3.6 MMOL/L — SIGNIFICANT CHANGE UP (ref 3.5–5.3)
POTASSIUM SERPL-SCNC: 3.6 MMOL/L — SIGNIFICANT CHANGE UP (ref 3.5–5.3)
RBC # BLD: 3.81 M/UL — LOW (ref 4.2–5.8)
RBC # FLD: 11.9 % — SIGNIFICANT CHANGE UP (ref 10.3–14.5)
SODIUM SERPL-SCNC: 139 MMOL/L — SIGNIFICANT CHANGE UP (ref 135–145)
WBC # BLD: 8.59 K/UL — SIGNIFICANT CHANGE UP (ref 3.8–10.5)
WBC # FLD AUTO: 8.59 K/UL — SIGNIFICANT CHANGE UP (ref 3.8–10.5)

## 2023-02-23 PROCEDURE — 86900 BLOOD TYPING SEROLOGIC ABO: CPT

## 2023-02-23 PROCEDURE — 86901 BLOOD TYPING SEROLOGIC RH(D): CPT

## 2023-02-23 PROCEDURE — 86850 RBC ANTIBODY SCREEN: CPT

## 2023-02-23 PROCEDURE — 96374 THER/PROPH/DIAG INJ IV PUSH: CPT

## 2023-02-23 PROCEDURE — 85025 COMPLETE CBC W/AUTO DIFF WBC: CPT

## 2023-02-23 PROCEDURE — 36415 COLL VENOUS BLD VENIPUNCTURE: CPT

## 2023-02-23 PROCEDURE — 83690 ASSAY OF LIPASE: CPT

## 2023-02-23 PROCEDURE — 99285 EMERGENCY DEPT VISIT HI MDM: CPT

## 2023-02-23 PROCEDURE — 81001 URINALYSIS AUTO W/SCOPE: CPT

## 2023-02-23 PROCEDURE — 80048 BASIC METABOLIC PNL TOTAL CA: CPT

## 2023-02-23 PROCEDURE — 85610 PROTHROMBIN TIME: CPT

## 2023-02-23 PROCEDURE — 85730 THROMBOPLASTIN TIME PARTIAL: CPT

## 2023-02-23 PROCEDURE — 87086 URINE CULTURE/COLONY COUNT: CPT

## 2023-02-23 PROCEDURE — 88304 TISSUE EXAM BY PATHOLOGIST: CPT

## 2023-02-23 PROCEDURE — 85027 COMPLETE CBC AUTOMATED: CPT

## 2023-02-23 PROCEDURE — 93005 ELECTROCARDIOGRAM TRACING: CPT

## 2023-02-23 PROCEDURE — 80053 COMPREHEN METABOLIC PANEL: CPT

## 2023-02-23 PROCEDURE — C1889: CPT

## 2023-02-23 PROCEDURE — 87635 SARS-COV-2 COVID-19 AMP PRB: CPT

## 2023-02-23 PROCEDURE — 74177 CT ABD & PELVIS W/CONTRAST: CPT | Mod: MA

## 2023-02-23 RX ORDER — OXYCODONE HYDROCHLORIDE 5 MG/1
1 TABLET ORAL
Qty: 8 | Refills: 0
Start: 2023-02-23 | End: 2023-02-24

## 2023-02-23 RX ORDER — TAMSULOSIN HYDROCHLORIDE 0.4 MG/1
1 CAPSULE ORAL
Qty: 7 | Refills: 0
Start: 2023-02-23 | End: 2023-03-01

## 2023-02-23 RX ORDER — METRONIDAZOLE 500 MG
1 TABLET ORAL
Qty: 21 | Refills: 0
Start: 2023-02-23 | End: 2023-03-01

## 2023-02-23 RX ORDER — CIPROFLOXACIN LACTATE 400MG/40ML
1 VIAL (ML) INTRAVENOUS
Qty: 14 | Refills: 0
Start: 2023-02-23 | End: 2023-03-01

## 2023-02-23 RX ADMIN — PIPERACILLIN AND TAZOBACTAM 25 GRAM(S): 4; .5 INJECTION, POWDER, LYOPHILIZED, FOR SOLUTION INTRAVENOUS at 05:52

## 2023-02-23 RX ADMIN — AMLODIPINE BESYLATE 5 MILLIGRAM(S): 2.5 TABLET ORAL at 05:52

## 2023-02-23 RX ADMIN — ENOXAPARIN SODIUM 40 MILLIGRAM(S): 100 INJECTION SUBCUTANEOUS at 05:52

## 2023-02-23 RX ADMIN — Medication 30 MILLILITER(S): at 11:59

## 2023-02-23 NOTE — PROGRESS NOTE ADULT - NS ATTEND AMEND GEN_ALL_CORE FT
Pt seen and examined. PIOD 1 s/p lap appendectomy for gangrenous perforated appendicitis.  Has been OOB to ambulate. Montalvo catheter had to be placed due to retention. Urine appears dark. Tolerating clears. Reports passing flatus and just had a BM.   Labs reviewed, Cr elevated from baseline, WBC 25 Abd- soft, mildly distended, + incisional tenderness, no guarding no rebound JAZLYN serosanguinous.   Continue IV abx, advance diet as tolerated.   Appreciate Urology recommendations, leave montalvo for now.
Pt seen and examined. Reports mild abdominal pain, mostly at suprapubic drain site.  Afeb vss, abd soft, NT, ND no guarding no rebound JAZLYN drain serosang, murky  Labs reviewed, WBC nml, BMP nml  Tolerating diet. Pain controlled with oral meds, + bowel function  Stable for discharge to home today. Discussed discharge instructions with patient and he is aware of how to care for and empty drain.
Pt seen and examined. POD 2 s/p perforated gangrenous appendicitis.   OOB to ambulate. Tolerating diet.   Pitts in place per urology, urine less concentrated.  Abd- soft, minimal tenderness at incisions and drain site, no guarding no rebound. JAZLYN drain- still murky serosang.   WBC still elevated, Cr improved  Cont IV abx, transition to po pain meds.

## 2023-02-23 NOTE — PROGRESS NOTE ADULT - NSPROGADDITIONALINFOA_GEN_ALL_CORE
Pt seen and examined. POD 2 s/p perforated gangrenous appendicitis.   OOB to ambulate. Tolerating diet.   Ptits in place per urology, urine less concentrated.  Abd- soft, minimal tenderness at incisions and drain site, no guarding no rebound. JAZLYN drain- still murky serosang.   WBC still elevated, Cr improved  Cont IV abx, transition to po pain meds.
Pt seen and examined. PIOD 1 s/p lap appendectomy for gangrenous perforated appendicitis.  Has been OOB to ambulate. Montalvo catheter had to be placed due to retention. Urine appears dark. Tolerating clears. Reports passing flatus and just had a BM. Labs reviewed, Cr elevated from baseline, WBC 25  Abd- soft, mildly distended, + incisional tenderness, no guarding no rebound JAZLYN serosanguinous.   Continue IV abx, advance diet as tolerated.   Appreciate Urology recommendations, leave montalvo for now.
Pt seen and examined. Reports mild abdominal pain, mostly at suprapubic drain site.  Afeb vss, abd soft, NT, ND no guarding no rebound JAZLYN drain serosang, murky  Labs reviewed, WBC nml, BMP nml  Tolerating diet. Pain controlled with oral meds, + bowel function  Stable for discharge to home today. Discussed discharge instructions with patient and he is aware of how to care for and empty drain.

## 2023-02-23 NOTE — DISCHARGE NOTE NURSING/CASE MANAGEMENT/SOCIAL WORK - NSDCPEFALRISK_GEN_ALL_CORE
For information on Fall & Injury Prevention, visit: [email or web address] OR  [email or web address] OR  https://www.cdc.gov/steadi/patient.html

## 2023-02-23 NOTE — DISCHARGE NOTE NURSING/CASE MANAGEMENT/SOCIAL WORK - PATIENT PORTAL LINK FT
You can access the FollowMyHealth Patient Portal offered by [facility] by registering at the following website: [email or web address]. By joining [email or web address]’s FollowMyHealth portal, you will also be able to view your health information using other applications (apps) compatible with our system.

## 2023-02-23 NOTE — PROGRESS NOTE ADULT - SUBJECTIVE AND OBJECTIVE BOX
59 y/o Male s/p laparoscopic appendectomy 2/20 for perforated gangrenous appendicitis, post op urinary retention     -Regular diet   -Pain medication PRN   -IV abx   -ID f/u   -C/w Home Medication   -Flomax   -JAZLYN drain care   -C/w montalvo catheter  -Dc planning w/ abx an montalvo leg bag    59 y/o Male s/p laparoscopic appendectomy 2/20 for perforated gangrenous appendicitis, post op urinary retention     -Regular diet   -Pain medication PRN   -IV abx   -C/w Home Medication   -Flomax   -JAZLYN drain care   -C/w montalvo catheter  -Dc planning w/ PO abx and montalvo leg bag   -Outpatient follow up with Dr. Love Urology and Dr. Natarajan

## 2023-02-27 ENCOUNTER — APPOINTMENT (OUTPATIENT)
Age: 58
End: 2023-02-27
Payer: COMMERCIAL

## 2023-02-27 VITALS
BODY MASS INDEX: 29.82 KG/M2 | HEART RATE: 81 BPM | HEIGHT: 73 IN | SYSTOLIC BLOOD PRESSURE: 143 MMHG | OXYGEN SATURATION: 98 % | WEIGHT: 225 LBS | DIASTOLIC BLOOD PRESSURE: 72 MMHG | TEMPERATURE: 97.2 F

## 2023-02-27 DIAGNOSIS — N40.1 BENIGN PROSTATIC HYPERPLASIA WITH LOWER URINARY TRACT SYMPMS: ICD-10-CM

## 2023-02-27 DIAGNOSIS — R33.8 OTHER RETENTION OF URINE: ICD-10-CM

## 2023-02-27 DIAGNOSIS — N13.8 BENIGN PROSTATIC HYPERPLASIA WITH LOWER URINARY TRACT SYMPMS: ICD-10-CM

## 2023-02-27 LAB — SURGICAL PATHOLOGY STUDY: SIGNIFICANT CHANGE UP

## 2023-02-27 PROCEDURE — A4216: CPT | Mod: NC

## 2023-02-27 PROCEDURE — 51700 IRRIGATION OF BLADDER: CPT

## 2023-03-08 ENCOUNTER — APPOINTMENT (OUTPATIENT)
Dept: SURGERY | Facility: CLINIC | Age: 58
End: 2023-03-08
Payer: COMMERCIAL

## 2023-03-08 VITALS
SYSTOLIC BLOOD PRESSURE: 120 MMHG | TEMPERATURE: 96.8 F | DIASTOLIC BLOOD PRESSURE: 70 MMHG | OXYGEN SATURATION: 100 % | HEIGHT: 73 IN | WEIGHT: 218 LBS | BODY MASS INDEX: 28.89 KG/M2 | HEART RATE: 68 BPM

## 2023-03-08 DIAGNOSIS — Z82.49 FAMILY HISTORY OF ISCHEMIC HEART DISEASE AND OTHER DISEASES OF THE CIRCULATORY SYSTEM: ICD-10-CM

## 2023-03-08 DIAGNOSIS — Z80.0 FAMILY HISTORY OF MALIGNANT NEOPLASM OF DIGESTIVE ORGANS: ICD-10-CM

## 2023-03-08 DIAGNOSIS — K35.80 UNSPECIFIED ACUTE APPENDICITIS: ICD-10-CM

## 2023-03-08 DIAGNOSIS — Z90.49 ACQUIRED ABSENCE OF OTHER SPECIFIED PARTS OF DIGESTIVE TRACT: ICD-10-CM

## 2023-03-08 DIAGNOSIS — I10 ESSENTIAL (PRIMARY) HYPERTENSION: ICD-10-CM

## 2023-03-08 DIAGNOSIS — Q79.0 CONGENITAL DIAPHRAGMATIC HERNIA: ICD-10-CM

## 2023-03-08 PROCEDURE — 99024 POSTOP FOLLOW-UP VISIT: CPT

## 2023-03-08 NOTE — HISTORY OF PRESENT ILLNESS
[de-identified] : ANGELA GARBER is a 58 year old male who presents in the office for postop visit. Patient was admitted to [facility] with appendicitis. He underwent a Laparoscopic appendectomy on 02/21/2023 pathology results are consistent with Acute suppurative and partially gangrenous appendicitis with periappendicitis and perforation of the appendiceal wall. One benign lymph node in the periappendiceal adipose tissue. Today patient is doing well, c/o some pain at times . Denies any fevers, chills, nausea, vomiting, diarrhea or constipation. Patient able to tolerate regular diet with normal bowel movements. Surgical incisions are healing well. No sign of inflammation or exudate. Patient has a JAZLYN drain that is draining serous fluid approximately 5 to 10 ml. JAZLYN drain was removed today.

## 2023-03-08 NOTE — PHYSICAL EXAM
[Alert] : alert [Oriented to Person] : oriented to person [Oriented to Place] : oriented to place [Oriented to Time] : oriented to time [Calm] : calm [de-identified] : The patient is alert, well-groomed  [de-identified] : Breath sounds equal and bilateral, no wheezing no rales or rhonchi  [de-identified] :  good S1, S2, no murmurs, rubs, gallops bilateral  [de-identified] : full range of motion and no deformities appreciated.  [de-identified] : Incision sites is healing well. he has JAZLYN drain that is draining serous fluid approximately 5 to 10 ml. JAZLYN drain was removed today

## 2023-03-08 NOTE — ASSESSMENT
[FreeTextEntry1] : ANGELA GARBER is a 58 year old male who underwent a Laparoscopic appendectomy on 02/21/2023\par \par Patient is doing well. All surgical incisions are healing well and as expected. There is no evidence of infection or complication, and patient is progressing as expected. Post-operative wound care, activity, restrictions and precautions reinforced. He has  JAZLYN drain that is draining serous fluid approximately 5 to 10 ml. JAZLYN drain was removed today. Pathology results were discussed in detail. Patient was instructed no heavy lifting 2 to 4 weeks. Patient's questions and concerns addressed to patient's satisfaction. \par

## 2023-04-03 ENCOUNTER — APPOINTMENT (OUTPATIENT)
Dept: UROLOGY | Facility: CLINIC | Age: 58
End: 2023-04-03

## 2023-04-07 NOTE — DISCHARGE NOTE PROVIDER - NSDCDCMDCOMP_GEN_ALL_CORE
Blood pressure on today's visit was at  despite the patient skipping her morning dose of antihypertensives hence would like to monitor prior to adding more antihypertensives.  Patient had stopped taking her Lasix 20 mg given the urinary incontinence which could have resulted in increased blood pressure at home.  Plan  Advised her to cut the dose of Lasix into half that is 10 mg.  Regularly monitor the blood pressure  Follow-up in 3 to 4 weeks   This document is complete and the patient is ready for discharge.

## 2023-06-04 ENCOUNTER — NON-APPOINTMENT (OUTPATIENT)
Age: 58
End: 2023-06-04

## 2023-11-03 ENCOUNTER — NON-APPOINTMENT (OUTPATIENT)
Age: 58
End: 2023-11-03

## 2023-11-03 ENCOUNTER — MED ADMIN CHARGE (OUTPATIENT)
Age: 58
End: 2023-11-03

## 2023-11-03 ENCOUNTER — APPOINTMENT (OUTPATIENT)
Dept: RADIOLOGY | Facility: CLINIC | Age: 58
End: 2023-11-03
Payer: COMMERCIAL

## 2023-11-03 ENCOUNTER — APPOINTMENT (OUTPATIENT)
Dept: OTHER | Facility: CLINIC | Age: 58
End: 2023-11-03
Payer: COMMERCIAL

## 2023-11-03 VITALS
HEART RATE: 103 BPM | SYSTOLIC BLOOD PRESSURE: 177 MMHG | TEMPERATURE: 99.1 F | OXYGEN SATURATION: 98 % | HEIGHT: 73 IN | BODY MASS INDEX: 30.09 KG/M2 | WEIGHT: 227 LBS | RESPIRATION RATE: 18 BRPM | DIASTOLIC BLOOD PRESSURE: 86 MMHG

## 2023-11-03 VITALS — DIASTOLIC BLOOD PRESSURE: 86 MMHG | SYSTOLIC BLOOD PRESSURE: 161 MMHG | HEART RATE: 88 BPM

## 2023-11-03 DIAGNOSIS — Z23 ENCOUNTER FOR IMMUNIZATION: ICD-10-CM

## 2023-11-03 DIAGNOSIS — Z04.9 ENCOUNTER FOR EXAMINATION AND OBSERVATION FOR UNSPECIFIED REASON: ICD-10-CM

## 2023-11-03 DIAGNOSIS — J32.9 CHRONIC SINUSITIS, UNSPECIFIED: ICD-10-CM

## 2023-11-03 DIAGNOSIS — J31.0 CHRONIC RHINITIS: ICD-10-CM

## 2023-11-03 DIAGNOSIS — Z03.89 ENCOUNTER FOR OBSERVATION FOR OTHER SUSPECTED DISEASES AND CONDITIONS RULED OUT: ICD-10-CM

## 2023-11-03 DIAGNOSIS — K21.9 GASTRO-ESOPHAGEAL REFLUX DISEASE W/OUT ESOPHAGITIS: ICD-10-CM

## 2023-11-03 PROCEDURE — G0008: CPT

## 2023-11-03 PROCEDURE — 99215 OFFICE O/P EST HI 40 MIN: CPT | Mod: 25

## 2023-11-03 PROCEDURE — 99396 PREV VISIT EST AGE 40-64: CPT | Mod: 25

## 2023-11-03 PROCEDURE — 71046 X-RAY EXAM CHEST 2 VIEWS: CPT

## 2023-11-03 PROCEDURE — 90686 IIV4 VACC NO PRSV 0.5 ML IM: CPT

## 2023-11-03 RX ORDER — SERTRALINE HYDROCHLORIDE 50 MG/1
50 TABLET, FILM COATED ORAL
Qty: 90 | Refills: 0 | Status: COMPLETED | COMMUNITY
Start: 2022-08-09 | End: 2023-11-03

## 2023-11-03 RX ORDER — BENZONATATE 100 MG/1
100 CAPSULE ORAL
Qty: 30 | Refills: 0 | Status: COMPLETED | COMMUNITY
Start: 2022-09-26 | End: 2023-11-03

## 2023-11-03 RX ORDER — IBUPROFEN 600 MG/1
600 TABLET, FILM COATED ORAL 3 TIMES DAILY
Qty: 21 | Refills: 0 | Status: COMPLETED | COMMUNITY
Start: 2023-02-23 | End: 2023-11-03

## 2023-11-03 RX ORDER — ATOMOXETINE 80 MG/1
80 CAPSULE ORAL
Refills: 0 | Status: ACTIVE | COMMUNITY
Start: 2023-11-03

## 2023-11-03 RX ORDER — TRIAMCINOLONE ACETONIDE 1 MG/G
0.1 CREAM TOPICAL
Qty: 80 | Refills: 0 | Status: COMPLETED | COMMUNITY
Start: 2017-07-21 | End: 2023-11-03

## 2023-11-03 RX ORDER — TAMSULOSIN HYDROCHLORIDE 0.4 MG/1
0.4 CAPSULE ORAL
Qty: 30 | Refills: 3 | Status: COMPLETED | COMMUNITY
Start: 2023-02-27 | End: 2023-11-03

## 2023-11-03 RX ORDER — CALCIUM CARBONATE 500 MG/1
500 TABLET, CHEWABLE ORAL
Refills: 0 | Status: ACTIVE | COMMUNITY
Start: 2023-11-03

## 2023-11-07 LAB
ALBUMIN SERPL ELPH-MCNC: 4.6 G/DL
ALP BLD-CCNC: 112 U/L
ALT SERPL-CCNC: 43 U/L
ANION GAP SERPL CALC-SCNC: 11 MMOL/L
APPEARANCE: CLEAR
AST SERPL-CCNC: 25 U/L
BACTERIA: NEGATIVE /HPF
BASOPHILS # BLD AUTO: 0.07 K/UL
BASOPHILS NFR BLD AUTO: 0.7 %
BILIRUB SERPL-MCNC: 0.3 MG/DL
BILIRUBIN URINE: NEGATIVE
BLOOD URINE: NEGATIVE
BUN SERPL-MCNC: 17 MG/DL
CALCIUM SERPL-MCNC: 10 MG/DL
CAST: 0 /LPF
CHLORIDE SERPL-SCNC: 102 MMOL/L
CHOLEST SERPL-MCNC: 229 MG/DL
CO2 SERPL-SCNC: 26 MMOL/L
COLOR: YELLOW
CREAT SERPL-MCNC: 1.07 MG/DL
EGFR: 80 ML/MIN/1.73M2
EOSINOPHIL # BLD AUTO: 0.19 K/UL
EOSINOPHIL NFR BLD AUTO: 1.9 %
EPITHELIAL CELLS: 0 /HPF
GLUCOSE QUALITATIVE U: NEGATIVE MG/DL
GLUCOSE SERPL-MCNC: 88 MG/DL
HCT VFR BLD CALC: 43.7 %
HDLC SERPL-MCNC: 34 MG/DL
HGB BLD-MCNC: 14.6 G/DL
IMM GRANULOCYTES NFR BLD AUTO: 0.3 %
KETONES URINE: NEGATIVE MG/DL
LDLC SERPL CALC-MCNC: 100 MG/DL
LEUKOCYTE ESTERASE URINE: NEGATIVE
LYMPHOCYTES # BLD AUTO: 2.97 K/UL
LYMPHOCYTES NFR BLD AUTO: 29.6 %
MAN DIFF?: NORMAL
MCHC RBC-ENTMCNC: 30.6 PG
MCHC RBC-ENTMCNC: 33.4 GM/DL
MCV RBC AUTO: 91.6 FL
MICROSCOPIC-UA: NORMAL
MONOCYTES # BLD AUTO: 0.72 K/UL
MONOCYTES NFR BLD AUTO: 7.2 %
NEUTROPHILS # BLD AUTO: 6.07 K/UL
NEUTROPHILS NFR BLD AUTO: 60.3 %
NITRITE URINE: NEGATIVE
NONHDLC SERPL-MCNC: 195 MG/DL
PH URINE: 6
PLATELET # BLD AUTO: 324 K/UL
POTASSIUM SERPL-SCNC: 4.8 MMOL/L
PROT SERPL-MCNC: 7.6 G/DL
PROTEIN URINE: NEGATIVE MG/DL
RBC # BLD: 4.77 M/UL
RBC # FLD: 12 %
RED BLOOD CELLS URINE: 1 /HPF
SODIUM SERPL-SCNC: 139 MMOL/L
SPECIFIC GRAVITY URINE: 1.02
TRIGL SERPL-MCNC: 561 MG/DL
UROBILINOGEN URINE: 0.2 MG/DL
WBC # FLD AUTO: 10.05 K/UL
WHITE BLOOD CELLS URINE: 0 /HPF

## 2023-12-03 NOTE — PLAN
show [FreeTextEntry1] : Patient will follow up if needed. Warning signs, follow up, and restrictions were discussed with the patient.

## 2024-02-07 ENCOUNTER — APPOINTMENT (OUTPATIENT)
Dept: GASTROENTEROLOGY | Facility: CLINIC | Age: 59
End: 2024-02-07

## 2024-02-07 VITALS
SYSTOLIC BLOOD PRESSURE: 141 MMHG | BODY MASS INDEX: 29.03 KG/M2 | TEMPERATURE: 98 F | HEART RATE: 77 BPM | OXYGEN SATURATION: 97 % | DIASTOLIC BLOOD PRESSURE: 75 MMHG | WEIGHT: 220 LBS

## 2024-05-31 ENCOUNTER — APPOINTMENT (OUTPATIENT)
Dept: GASTROENTEROLOGY | Facility: CLINIC | Age: 59
End: 2024-05-31

## 2024-07-11 ENCOUNTER — APPOINTMENT (OUTPATIENT)
Dept: COLORECTAL SURGERY | Facility: CLINIC | Age: 59
End: 2024-07-11

## 2024-07-11 VITALS
HEIGHT: 73 IN | RESPIRATION RATE: 16 BRPM | BODY MASS INDEX: 29.16 KG/M2 | HEART RATE: 62 BPM | TEMPERATURE: 97.2 F | SYSTOLIC BLOOD PRESSURE: 161 MMHG | OXYGEN SATURATION: 97 % | DIASTOLIC BLOOD PRESSURE: 77 MMHG | WEIGHT: 220 LBS

## 2024-07-11 DIAGNOSIS — Z00.00 ENCOUNTER FOR GENERAL ADULT MEDICAL EXAMINATION W/OUT ABNORMAL FINDINGS: ICD-10-CM

## 2024-07-11 DIAGNOSIS — Z78.9 OTHER SPECIFIED HEALTH STATUS: ICD-10-CM

## 2024-07-11 PROCEDURE — 99202 OFFICE O/P NEW SF 15 MIN: CPT

## 2024-07-16 ENCOUNTER — APPOINTMENT (OUTPATIENT)
Dept: GASTROENTEROLOGY | Facility: CLINIC | Age: 59
End: 2024-07-16

## 2024-07-18 ENCOUNTER — APPOINTMENT (OUTPATIENT)
Dept: COLORECTAL SURGERY | Facility: CLINIC | Age: 59
End: 2024-07-18
Payer: COMMERCIAL

## 2024-07-18 DIAGNOSIS — Z12.11 ENCOUNTER FOR SCREENING FOR MALIGNANT NEOPLASM OF COLON: ICD-10-CM

## 2024-07-18 DIAGNOSIS — K64.9 UNSPECIFIED HEMORRHOIDS: ICD-10-CM

## 2024-07-18 PROCEDURE — 45380 COLONOSCOPY AND BIOPSY: CPT

## 2024-12-23 ENCOUNTER — APPOINTMENT (OUTPATIENT)
Dept: PULMONOLOGY | Facility: CLINIC | Age: 59
End: 2024-12-23
Payer: COMMERCIAL

## 2024-12-23 ENCOUNTER — NON-APPOINTMENT (OUTPATIENT)
Age: 59
End: 2024-12-23

## 2024-12-23 VITALS
TEMPERATURE: 97.9 F | SYSTOLIC BLOOD PRESSURE: 178 MMHG | HEIGHT: 72 IN | DIASTOLIC BLOOD PRESSURE: 70 MMHG | WEIGHT: 228 LBS | OXYGEN SATURATION: 97 % | BODY MASS INDEX: 30.88 KG/M2 | HEART RATE: 87 BPM

## 2024-12-23 DIAGNOSIS — R91.8 OTHER NONSPECIFIC ABNORMAL FINDING OF LUNG FIELD: ICD-10-CM

## 2024-12-23 DIAGNOSIS — Z82.49 FAMILY HISTORY OF ISCHEMIC HEART DISEASE AND OTHER DISEASES OF THE CIRCULATORY SYSTEM: ICD-10-CM

## 2024-12-23 DIAGNOSIS — R05.3 CHRONIC COUGH: ICD-10-CM

## 2024-12-23 DIAGNOSIS — J31.0 CHRONIC RHINITIS: ICD-10-CM

## 2024-12-23 DIAGNOSIS — R07.9 CHEST PAIN, UNSPECIFIED: ICD-10-CM

## 2024-12-23 DIAGNOSIS — R91.1 SOLITARY PULMONARY NODULE: ICD-10-CM

## 2024-12-23 PROCEDURE — 99204 OFFICE O/P NEW MOD 45 MIN: CPT

## 2024-12-23 RX ORDER — ROSUVASTATIN CALCIUM 20 MG/1
20 TABLET, FILM COATED ORAL
Refills: 0 | Status: ACTIVE | COMMUNITY

## 2024-12-23 RX ORDER — ATOMOXETINE 18 MG/1
18 CAPSULE ORAL
Refills: 0 | Status: ACTIVE | COMMUNITY

## 2024-12-27 ENCOUNTER — APPOINTMENT (OUTPATIENT)
Dept: PULMONOLOGY | Facility: CLINIC | Age: 59
End: 2024-12-27

## 2025-01-23 ENCOUNTER — NON-APPOINTMENT (OUTPATIENT)
Age: 60
End: 2025-01-23

## 2025-02-06 ENCOUNTER — APPOINTMENT (OUTPATIENT)
Dept: PULMONOLOGY | Facility: CLINIC | Age: 60
End: 2025-02-06
Payer: COMMERCIAL

## 2025-02-06 VITALS
WEIGHT: 227 LBS | DIASTOLIC BLOOD PRESSURE: 69 MMHG | HEART RATE: 84 BPM | SYSTOLIC BLOOD PRESSURE: 166 MMHG | RESPIRATION RATE: 16 BRPM | OXYGEN SATURATION: 99 % | TEMPERATURE: 97.6 F | BODY MASS INDEX: 30.75 KG/M2 | HEIGHT: 72 IN

## 2025-02-06 DIAGNOSIS — R05.3 CHRONIC COUGH: ICD-10-CM

## 2025-02-06 DIAGNOSIS — Z87.891 PERSONAL HISTORY OF NICOTINE DEPENDENCE: ICD-10-CM

## 2025-02-06 DIAGNOSIS — K21.9 GASTRO-ESOPHAGEAL REFLUX DISEASE W/OUT ESOPHAGITIS: ICD-10-CM

## 2025-02-06 DIAGNOSIS — J31.0 CHRONIC RHINITIS: ICD-10-CM

## 2025-02-06 DIAGNOSIS — R91.1 SOLITARY PULMONARY NODULE: ICD-10-CM

## 2025-02-06 DIAGNOSIS — R07.9 CHEST PAIN, UNSPECIFIED: ICD-10-CM

## 2025-02-06 PROCEDURE — 94727 GAS DIL/WSHOT DETER LNG VOL: CPT

## 2025-02-06 PROCEDURE — 94060 EVALUATION OF WHEEZING: CPT

## 2025-02-06 PROCEDURE — 94729 DIFFUSING CAPACITY: CPT

## 2025-02-06 PROCEDURE — 99215 OFFICE O/P EST HI 40 MIN: CPT | Mod: 25

## 2025-02-24 ENCOUNTER — APPOINTMENT (OUTPATIENT)
Dept: OTHER | Facility: CLINIC | Age: 60
End: 2025-02-24
Payer: COMMERCIAL

## 2025-02-24 DIAGNOSIS — J31.0 CHRONIC RHINITIS: ICD-10-CM

## 2025-02-24 DIAGNOSIS — K21.9 GASTRO-ESOPHAGEAL REFLUX DISEASE W/OUT ESOPHAGITIS: ICD-10-CM

## 2025-02-24 DIAGNOSIS — J32.9 CHRONIC SINUSITIS, UNSPECIFIED: ICD-10-CM

## 2025-02-24 DIAGNOSIS — Z04.9 ENCOUNTER FOR EXAMINATION AND OBSERVATION FOR UNSPECIFIED REASON: ICD-10-CM

## 2025-02-24 PROCEDURE — 99215 OFFICE O/P EST HI 40 MIN: CPT | Mod: 95,25

## 2025-02-24 PROCEDURE — 99396 PREV VISIT EST AGE 40-64: CPT | Mod: 95

## 2025-02-24 RX ORDER — FAMOTIDINE 20 MG/1
20 TABLET, FILM COATED ORAL
Qty: 180 | Refills: 3 | Status: ACTIVE | COMMUNITY
Start: 2025-02-24 | End: 1900-01-01

## 2025-02-24 RX ORDER — FLUTICASONE PROPIONATE 50 UG/1
50 SPRAY, METERED NASAL
Qty: 3 | Refills: 3 | Status: ACTIVE | COMMUNITY
Start: 2025-02-24 | End: 1900-01-01

## 2025-06-06 ENCOUNTER — APPOINTMENT (OUTPATIENT)
Dept: PULMONOLOGY | Facility: CLINIC | Age: 60
End: 2025-06-06
Payer: COMMERCIAL

## 2025-06-06 VITALS
OXYGEN SATURATION: 97 % | RESPIRATION RATE: 17 BRPM | HEART RATE: 60 BPM | DIASTOLIC BLOOD PRESSURE: 76 MMHG | HEIGHT: 72.05 IN | SYSTOLIC BLOOD PRESSURE: 147 MMHG | BODY MASS INDEX: 30.48 KG/M2 | TEMPERATURE: 98.3 F | WEIGHT: 225 LBS

## 2025-06-06 PROCEDURE — 99214 OFFICE O/P EST MOD 30 MIN: CPT

## 2025-06-06 PROCEDURE — G2211 COMPLEX E/M VISIT ADD ON: CPT | Mod: NC

## (undated) DEVICE — TROCAR ETHICON ENDOPATH XCEL HASSON BLUNT TIP 12MM X 100MM STABILITY

## (undated) DEVICE — NDL HYPO REGULAR BEVEL 25G X 1.5" (BLUE)

## (undated) DEVICE — D HELP - CLEARVIEW CLEARIFY SYSTEM

## (undated) DEVICE — STAPLER COVIDIEN ENDO GIA STANDARD HANDLE

## (undated) DEVICE — ENDOCATCH 10MM SPECIMEN POUCH

## (undated) DEVICE — GLV 6.5 PROTEXIS (WHITE)

## (undated) DEVICE — DRSG STERISTRIPS 0.5 X 4"

## (undated) DEVICE — TUBING STRYKER PNEUMOSURE HEATED RTP

## (undated) DEVICE — GLV 8 PROTEXIS (WHITE)

## (undated) DEVICE — LIGASURE MARYLAND 44CM

## (undated) DEVICE — VENODYNE/SCD SLEEVE CALF MEDIUM

## (undated) DEVICE — TUBING STRYKEFLOW II SUCTION / IRRIGATOR

## (undated) DEVICE — SUT BIOSYN 4-0 18" P-12

## (undated) DEVICE — WARMING BLANKET UPPER ADULT

## (undated) DEVICE — PACK GENERAL LAPAROSCOPY

## (undated) DEVICE — TROCAR COVIDIEN VERSAPORT BLADELESS OPTICAL 5MM STANDARD

## (undated) DEVICE — GLV 8 PROTEXIS (BLUE)

## (undated) DEVICE — FOR-ESU VALLEYLAB T7E14982DX: Type: DURABLE MEDICAL EQUIPMENT

## (undated) DEVICE — SPONGE ENDO PEANUT 5MM

## (undated) DEVICE — Device

## (undated) DEVICE — SUT POLYSORB 0 30" GU-46

## (undated) DEVICE — DRSG TRACH DRAINAGE 4X4

## (undated) DEVICE — GLV 6.5 PROTEXIS (BLUE)

## (undated) DEVICE — DRSG 2X2

## (undated) DEVICE — SUT SOFSILK 2-0 18" C-15

## (undated) DEVICE — SOL IRR POUR NS 0.9% 1500ML

## (undated) DEVICE — DRAIN RESERVOIR FOR JACKSON PRATT 100CC CARDINAL

## (undated) DEVICE — ELCTR GROUNDING PAD ADULT COVIDIEN

## (undated) DEVICE — DRSG MASTISOL